# Patient Record
Sex: MALE
[De-identification: names, ages, dates, MRNs, and addresses within clinical notes are randomized per-mention and may not be internally consistent; named-entity substitution may affect disease eponyms.]

---

## 2017-12-11 ENCOUNTER — HOSPITAL ENCOUNTER (OUTPATIENT)
Dept: HOSPITAL 92 - LABBT | Age: 62
Discharge: HOME | End: 2017-12-11
Attending: UROLOGY
Payer: COMMERCIAL

## 2017-12-11 DIAGNOSIS — N13.39: Primary | ICD-10-CM

## 2017-12-11 DIAGNOSIS — N32.89: ICD-10-CM

## 2017-12-11 DIAGNOSIS — N28.9: ICD-10-CM

## 2017-12-11 LAB
ANION GAP SERPL CALC-SCNC: 12 MMOL/L (ref 10–20)
APTT PPP: 38.6 SEC (ref 22.9–36.1)
BUN SERPL-MCNC: 27 MG/DL (ref 8.4–25.7)
CALCIUM SERPL-MCNC: 9.5 MG/DL (ref 7.8–10.44)
CHLORIDE SERPL-SCNC: 107 MMOL/L (ref 98–107)
CO2 SERPL-SCNC: 26 MMOL/L (ref 23–31)
CREAT CL PREDICTED SERPL C-G-VRATE: 0 ML/MIN (ref 70–130)
HCT VFR BLD CALC: 40.3 % (ref 42–52)
HYALINE CASTS #/AREA URNS LPF: (no result) LPF
PROTHROMBIN TIME: 13.7 SEC (ref 12–14.7)
RBC # BLD AUTO: 4.45 MILL/UL (ref 4.7–6.1)
RBC UR QL AUTO: (no result) HPF (ref 0–3)
WBC # BLD AUTO: 6.8 THOU/UL (ref 4.8–10.8)

## 2017-12-11 PROCEDURE — 80048 BASIC METABOLIC PNL TOTAL CA: CPT

## 2017-12-11 PROCEDURE — 85730 THROMBOPLASTIN TIME PARTIAL: CPT

## 2017-12-11 PROCEDURE — 88112 CYTOPATH CELL ENHANCE TECH: CPT

## 2017-12-11 PROCEDURE — 87086 URINE CULTURE/COLONY COUNT: CPT

## 2017-12-11 PROCEDURE — 85610 PROTHROMBIN TIME: CPT

## 2017-12-11 PROCEDURE — 85027 COMPLETE CBC AUTOMATED: CPT

## 2017-12-11 PROCEDURE — 81001 URINALYSIS AUTO W/SCOPE: CPT

## 2017-12-12 ENCOUNTER — HOSPITAL ENCOUNTER (OUTPATIENT)
Dept: HOSPITAL 92 - LABBT | Age: 62
Discharge: HOME | End: 2017-12-12
Attending: UROLOGY
Payer: COMMERCIAL

## 2017-12-12 ENCOUNTER — HOSPITAL ENCOUNTER (OUTPATIENT)
Dept: HOSPITAL 92 - BICCT | Age: 62
Discharge: HOME | End: 2017-12-12
Attending: UROLOGY
Payer: COMMERCIAL

## 2017-12-12 DIAGNOSIS — N13.9: ICD-10-CM

## 2017-12-12 DIAGNOSIS — Z01.812: Primary | ICD-10-CM

## 2017-12-12 DIAGNOSIS — N32.89: ICD-10-CM

## 2017-12-12 DIAGNOSIS — N13.39: Primary | ICD-10-CM

## 2017-12-12 DIAGNOSIS — N13.30: ICD-10-CM

## 2017-12-12 PROCEDURE — 74176 CT ABD & PELVIS W/O CONTRAST: CPT

## 2017-12-12 PROCEDURE — 88112 CYTOPATH CELL ENHANCE TECH: CPT

## 2017-12-12 PROCEDURE — 86900 BLOOD TYPING SEROLOGIC ABO: CPT

## 2017-12-12 PROCEDURE — 86901 BLOOD TYPING SEROLOGIC RH(D): CPT

## 2017-12-12 PROCEDURE — 86850 RBC ANTIBODY SCREEN: CPT

## 2017-12-15 ENCOUNTER — HOSPITAL ENCOUNTER (OUTPATIENT)
Dept: HOSPITAL 92 - SDC | Age: 62
Discharge: HOME | End: 2017-12-15
Attending: UROLOGY
Payer: COMMERCIAL

## 2017-12-15 VITALS — BODY MASS INDEX: 32.5 KG/M2

## 2017-12-15 DIAGNOSIS — I12.9: ICD-10-CM

## 2017-12-15 DIAGNOSIS — N17.9: ICD-10-CM

## 2017-12-15 DIAGNOSIS — Z79.899: ICD-10-CM

## 2017-12-15 DIAGNOSIS — Z79.2: ICD-10-CM

## 2017-12-15 DIAGNOSIS — N13.30: ICD-10-CM

## 2017-12-15 DIAGNOSIS — H53.461: ICD-10-CM

## 2017-12-15 DIAGNOSIS — E66.9: ICD-10-CM

## 2017-12-15 DIAGNOSIS — N40.0: ICD-10-CM

## 2017-12-15 DIAGNOSIS — Z98.890: ICD-10-CM

## 2017-12-15 DIAGNOSIS — N18.3: ICD-10-CM

## 2017-12-15 DIAGNOSIS — E11.3299: ICD-10-CM

## 2017-12-15 DIAGNOSIS — E11.22: ICD-10-CM

## 2017-12-15 DIAGNOSIS — Z95.818: ICD-10-CM

## 2017-12-15 DIAGNOSIS — I69.398: ICD-10-CM

## 2017-12-15 DIAGNOSIS — Z79.82: ICD-10-CM

## 2017-12-15 DIAGNOSIS — C66.2: Primary | ICD-10-CM

## 2017-12-15 DIAGNOSIS — E78.5: ICD-10-CM

## 2017-12-15 DIAGNOSIS — Z87.891: ICD-10-CM

## 2017-12-15 PROCEDURE — 88305 TISSUE EXAM BY PATHOLOGIST: CPT

## 2017-12-15 PROCEDURE — 86850 RBC ANTIBODY SCREEN: CPT

## 2017-12-15 PROCEDURE — C1758 CATHETER, URETERAL: HCPCS

## 2017-12-15 PROCEDURE — C1769 GUIDE WIRE: HCPCS

## 2017-12-15 PROCEDURE — 86901 BLOOD TYPING SEROLOGIC RH(D): CPT

## 2017-12-15 PROCEDURE — 0T778DZ DILATION OF LEFT URETER WITH INTRALUMINAL DEVICE, VIA NATURAL OR ARTIFICIAL OPENING ENDOSCOPIC: ICD-10-PCS | Performed by: UROLOGY

## 2017-12-15 PROCEDURE — 86900 BLOOD TYPING SEROLOGIC ABO: CPT

## 2017-12-15 PROCEDURE — 0TB78ZX EXCISION OF LEFT URETER, VIA NATURAL OR ARTIFICIAL OPENING ENDOSCOPIC, DIAGNOSTIC: ICD-10-PCS | Performed by: UROLOGY

## 2017-12-15 PROCEDURE — 74420 UROGRAPHY RTRGR +-KUB: CPT

## 2017-12-15 NOTE — OP
DATE OF SERVICE:  12/15/2017

 

PREOPERATIVE DIAGNOSES:  A 62-year-old male with left hydronephrosis, acute 
renal insufficiency, rule out malignancy.

 

POSTOPERATIVE DIAGNOSES:  A 62-year-old male with left hydronephrosis, acute 
renal insufficiency, rule out malignancy.

 

PROCEDURE:  Cystoscopy, left retrograde pyelogram, 6 x 28 double-J ureteral 
stent placement with distal tail in situ, rigid ureteroscopy, ureteral biopsy 
x3.

 

SURGEON:  Eleonora Mckay D.O.

 

ANESTHESIA:  General.

 

COMPLICATIONS:  None apparent.

 

DISPOSITION:  To the recovery room in stable condition.

 

IV FLUIDS:  1100 mL.

 

ESTIMATED BLOOD LOSS:  Minimal.

 

DRAINS:  A 6 x 28 left double-J ureteral stent, 18 Moz[place]ican 10 mL urethral Zeng 
catheter to gravity leg bag.

 

INTRAOPERATIVE FINDINGS:

1. MIld/ Moderate benign prostatic hyperplasia.

2.  No evidence of bladder mass.

3.  Left intramural ureteral debris with no evidence of distal ureteral mass.

4.  Left proximal ureteral mass suspicious for TCC.

5.  Severe left hydronephrosis.

 

SPECIMEN:

1.  Left ureteral biopsy x3.

2.  Left distal UVJ intramural ureteral debris sent as a separate specimen.

 

INDICATIONS FOR THE PROCEDURE AND HISTORY:  Mr. Melendez is a 62-year-old male 
referred to me by Nephrology for renal insufficiency and severe hydronephrosis.
  Renal bladder ultrasound demonstrated possible bladder mass; however, this 
was nonspecific.  A CT staging with noncontrast was obtained, there was a 
possibility of bladder mass obstructing the  ureter; however, per my review, 
there is a hyperdensity at the proximal ureter measuring 3.7 cm in craniocaudal 
dimension and distal to this, there is no evidence of hydroureter.  He presents 
today for diagnostic evaluation.  He has been cleared by Internal Medicine, 
Cardiology;  Neurology cleared 

him to be on baby aspirin from 325mg .  Risks and complications including, but 
not limited to, bleeding, pain, infection, injury to adjacent organs, urosepsis
, PE, DVT, perioperative morbidity and mortality CVA reviewed.  All questions 
answered to his satisfaction, he desired to proceed.

 

DESCRIPTION OF THE PROCEDURE:  After an informed consent was signed, the 
patient was taken to the operating room, placed in a dorsal lithotomy position 
with the genital area prepped and draped in the usual surgical sterile fashion.
  Broad-spectrum antibiotics were provided.  A 21 Mozambican cystoscope was 
utilized for cystoscopy.  There was evidence of mild/moderate obstructing 
lateral lobes.  Bladder was entered without any issues.  There was no evidence 
of bladder lesion.  The UOs were identified in normal anatomical location.  
However, the left intramural ureter  appeared have intrinsic prominence  due to 
mass effect.  I was unable to perform a proper retrograde pyelogram as there 
was immediate efflux retrograde due to high-grade obstruction of the intramural 
portion, and unable to pass a 0.35 sensor nor  angled Glidewire.  I 
subsequently transitioned to  rigid ureteroscope, with the ureter engaged with 
the ureteroscope, I was able to pass a 0.35 angled Glidewire to the level of 
the proximal ureter.  Upon passing the ureteroscope/and wire  to assess the 
distal intramural ureter, there was moderate amount of sedimentous whitish 
debris that effluxed . This was evacuated, and sent as a specimen. 
Ureteroscopic assessment of the distal ureter did not demonstrate evidence of 
intrinsic ureteral mass. We then subsequently transitioned to a cystoscope, as 
there was resistance at the level of the L3-L4 ureter with the wire.  We passed 
an open-ended catheter to this level, there was a high-grade obstruction, which 
I was unable to opacify proximal to this on retrograde pyelogram.  With 
negotiation I was able to pass a 0.35 Glidewire to the left upper pole and 
subsequently passed a 5 Moz[place]ican open-ended catheter to the level of the renal 
pelvis.  Retrograde pyelogram confirmed proper placement of the wire 
demonstrating severe hydronephrosis.  The wire was then switched to a 0.35 
sensor wire.  Diagnostic ureteroscopy demonstrated  a moderate large calcified 
mass at the level of L3-L4 ureter consistent with the CAT scan location.  Using 
a back loading rigid cold cup biopsy, obtained three separate biopsies appeared 
to be good specimen.  It is highly suspicious for TCC.  We subsequently passed 
a 6 x 28 double-J ureteral stent with distal tail in situ.  I did scope his 
ureter again to ensure that there was no other lesion, which I did not 
appreciate distal to the proximal ureteral mass.  After the stent was placed, 
bladder was completely evacuated.  An 18 [place] 10 mL urethral Zeng catheter 
was placed as there was moderate amount of debris with decompression of the 
severely hydronephrotic kidney.  He was transferred to the recovery room in 
stable condition.  He will follow up with me next week for removal of Zeng 
catheter.  Discharge with ciprofloxacin 250 mg one p.o. b.i.d. for 7 days, 
Colace p.r.n., AZO p.r.n., VESIcare 5 mg 1 p.o. b.i.d. #5, Norco 5/325 #50 one 
to two p.o. q.6-8 hours p.r.n. prescription is provided.  He will follow up 
regarding pathology results for further staging.

 

MTDD

## 2017-12-15 NOTE — RAD
RETROGRADE PYELOGRAM 11 VIEWS:

12/15/17

 

Attention Brissa in billing: yes, this is an 11 view study.

 

HISTORY: 

62-year-old male with bladder mass and hydronephrosis. 

 

COMPARISON:  

No prior radiographs, CTs or ultrasounds, of the abdomen or pelvis. 

 

FINDINGS:  

Fluoroscopic spot images obtained in the OR of the left side of the abdomen and pelvis demonstrate fi
rst a wire access of what is presumed to be the mid left ureter, followed by access of what is though
t to be the left renal collecting system, followed by ingestion of contrast opacifying two severely d
ilated left renal calyces. The rest of the left renal collecting system is not opacified. Final image
s demonstrate placement of a left nephroureteral stent.

 

IMPRESSION:  

1.      Severe left hydronephrosis. 

2.      Placement of left ureteral stent. 

 

POS: [facility]

## 2017-12-28 ENCOUNTER — HOSPITAL ENCOUNTER (OUTPATIENT)
Dept: HOSPITAL 92 - NM | Age: 62
Discharge: HOME | End: 2017-12-28
Attending: UROLOGY
Payer: COMMERCIAL

## 2017-12-28 DIAGNOSIS — N13.30: ICD-10-CM

## 2017-12-28 DIAGNOSIS — C66.2: Primary | ICD-10-CM

## 2017-12-28 PROCEDURE — A9503 TC99M MEDRONATE: HCPCS

## 2017-12-28 PROCEDURE — 78306 BONE IMAGING WHOLE BODY: CPT

## 2017-12-28 NOTE — NM
NUCLEAR MEDICINE BONE SCAN:

12/28/17

 

HISTORY: 

Left ureteral cancer. 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

 

COMPARISON:  

CT 12/12/17 outside facility. 

 

FINDINGS:  

Whole body delayed imaging was performed after the intravenous administration of 30 millicuries techn
etium 99m MDP. 

 

There is normal osseous uptake of radiotracer. No focal increased areas of decreased areas of rotatio
nal uptake to suggest metastatic disease. Left sided hydronephrosis. Moderate degenerative changes of
 the knees. Moderate degenerative changes of the left ankle and midfoot. 

 

IMPRESSION:  

1.      No evidence of osseous metastatic disease. 

2.      Left sided hydronephrosis. 

 

POS: [facility]

## 2017-12-29 ENCOUNTER — HOSPITAL ENCOUNTER (OUTPATIENT)
Dept: HOSPITAL 92 - CT | Age: 62
Discharge: HOME | End: 2017-12-29
Attending: INTERNAL MEDICINE
Payer: COMMERCIAL

## 2017-12-29 DIAGNOSIS — J43.9: ICD-10-CM

## 2017-12-29 DIAGNOSIS — C66.2: Primary | ICD-10-CM

## 2017-12-29 DIAGNOSIS — R91.1: ICD-10-CM

## 2017-12-29 DIAGNOSIS — N40.0: ICD-10-CM

## 2017-12-29 DIAGNOSIS — N28.89: ICD-10-CM

## 2017-12-29 LAB
ALP SERPL-CCNC: 62 U/L (ref 40–150)
ALT SERPL W P-5'-P-CCNC: 36 U/L (ref 8–55)
ANION GAP SERPL CALC-SCNC: 12 MMOL/L (ref 10–20)
AST SERPL-CCNC: 24 U/L (ref 5–34)
BILIRUB DIRECT SERPL-MCNC: 0.3 MG/DL (ref 0.1–0.3)
BILIRUB SERPL-MCNC: 0.6 MG/DL (ref 0.2–1.2)
BUN SERPL-MCNC: 29 MG/DL (ref 8.4–25.7)
CALCIUM SERPL-MCNC: 10.2 MG/DL (ref 7.8–10.44)
CHLORIDE SERPL-SCNC: 107 MMOL/L (ref 98–107)
CO2 SERPL-SCNC: 26 MMOL/L (ref 23–31)
CREAT CL PREDICTED SERPL C-G-VRATE: 0 ML/MIN (ref 70–130)

## 2017-12-29 PROCEDURE — 71250 CT THORAX DX C-: CPT

## 2017-12-29 PROCEDURE — 74177 CT ABD & PELVIS W/CONTRAST: CPT

## 2017-12-29 PROCEDURE — 80076 HEPATIC FUNCTION PANEL: CPT

## 2017-12-29 PROCEDURE — 80048 BASIC METABOLIC PNL TOTAL CA: CPT

## 2017-12-29 NOTE — CT
CT CHEST AND ABDOMEN AND PELVIS WITHOUT IV CONTRAST:

 

HISTORY:

Left ureteral cancer.  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

 

FINDINGS:

Absence of oral and IV contrast reduces the sensitivity of the exam, particularly for evaluation of m
ediastinal hilar vascular structures and solid organs involved.  The possibility of metastatic diseas
e cannot be completely excluded on this study.

 

Bullous changes are seen in the lung fields bilaterally.  There is a 5 mm peripheral nodule in the le
ft lower lobe.  No pleural or pericardial effusions are seen.

 

No mediastinal, axillary, or periaortic lymphadenopathy is seen.  There are vascular calcifications w
ithout evidence of aneurysmal dilatation of the thoracic aorta.

 

No calcified gallstones are seen.  No free air or free fluid is noted in the abdomen or pelvis.  A no
rmal appearing appendix is seen.  A fat-containing left inguinal hernia is present.

 

No calculi are seen in the kidneys, ureters, or urinary bladder.  There is a 1 cm low density lesion 
in the right kidney and a 1.4 cm low density lesion in the left renal cortex.  There is a left-sided 
ureteral stent.  There is thickening of the proximal left ureter and left hydronephrosis.  The prosta
te is enlarged.  There are degenerative changes in the thoracolumbar spine.  No osteolytic or osteobl
astic lesions are seen.

 

IMPRESSION:

1.  Bullous emphysema.

 

2.  A 5 mm, indeterminate left lower lobe lung nodule.

 

3.  Low density lesion in the kidneys.  Ultrasound is recommended to confirm that these represent cys
ts.

 

4.  Left proximal ureteric mass/malignancy.

 

5.  Prostatic enlargement.

 

POS: [facility]

## 2018-01-22 ENCOUNTER — HOSPITAL ENCOUNTER (OUTPATIENT)
Dept: HOSPITAL 92 - SDC | Age: 63
Discharge: HOME | End: 2018-01-22
Attending: SURGERY
Payer: COMMERCIAL

## 2018-01-22 VITALS — BODY MASS INDEX: 33.5 KG/M2

## 2018-01-22 DIAGNOSIS — C64.9: Primary | ICD-10-CM

## 2018-01-22 DIAGNOSIS — E66.9: ICD-10-CM

## 2018-01-22 DIAGNOSIS — E11.22: ICD-10-CM

## 2018-01-22 DIAGNOSIS — Z79.899: ICD-10-CM

## 2018-01-22 DIAGNOSIS — Z79.82: ICD-10-CM

## 2018-01-22 DIAGNOSIS — C66.2: ICD-10-CM

## 2018-01-22 DIAGNOSIS — M10.9: ICD-10-CM

## 2018-01-22 DIAGNOSIS — E11.3299: ICD-10-CM

## 2018-01-22 DIAGNOSIS — Z98.890: ICD-10-CM

## 2018-01-22 DIAGNOSIS — I12.9: ICD-10-CM

## 2018-01-22 DIAGNOSIS — E78.5: ICD-10-CM

## 2018-01-22 DIAGNOSIS — Z95.818: ICD-10-CM

## 2018-01-22 DIAGNOSIS — I69.398: ICD-10-CM

## 2018-01-22 DIAGNOSIS — H53.461: ICD-10-CM

## 2018-01-22 DIAGNOSIS — N18.3: ICD-10-CM

## 2018-01-22 PROCEDURE — C1788 PORT, INDWELLING, IMP: HCPCS

## 2018-01-22 PROCEDURE — 0JH63WZ INSERTION OF TOTALLY IMPLANTABLE VASCULAR ACCESS DEVICE INTO CHEST SUBCUTANEOUS TISSUE AND FASCIA, PERCUTANEOUS APPROACH: ICD-10-PCS | Performed by: SURGERY

## 2018-01-22 PROCEDURE — 71045 X-RAY EXAM CHEST 1 VIEW: CPT

## 2018-01-22 PROCEDURE — S0020 INJECTION, BUPIVICAINE HYDRO: HCPCS

## 2018-01-22 NOTE — OP
DATE OF PROCEDURE:  01/22/2018

 

PREOPERATIVE DIAGNOSIS:  Renal cell carcinoma.

 

POSTOPERATIVE DIAGNOSIS:  Renal cell carcinoma.

 

PROCEDURE:  MediPort (tunneled central line subcutaneous port,  CT injectable). 

 

SURGEON:  Riley Nguyen M.D.

 

ANESTHESIA:  General.

 

ESTIMATED BLOOD LOSS:  Minimal.

 

COMPLICATIONS:  None.

 

SPECIMEN:  None.

 

FINDINGS:  Tip of the catheter is at the atriocaval junction.

 

TECHNIQUE:  The patient was taken to the operation table.  After sedation was obtained, bilateral nec
k and chest was shaved, and draped in a sterile fashion.  Local anesthetic infiltrated over the right
 internal jugular vein.  Intrajugular vein cannulated using a 22-gauge finder needle followed by a Se
ldinger needle.  Wire was passed into the superior vena cava under fluoroscopic guidance.  A small ni
ck was made at the wire exit site.  A separate 3-cm incision was made in the right upper chest.  Subc
utaneous pocket made below the lower incision.  The tubing is threaded from the inferior to superior 
incision.  Introducer sheath was placed over the wire into the superior vena cava under fluoroscopic 
guidance.  The dilator and wire were removed.  The end of the catheter was threaded into the sheath a
nd the sheath was peeled away.  The tip of the catheter was at atriocaval junction.  MediPort tubing 
cut to fit the MediPort at the lower incision, connected this to the MediPort which was sewn to the c
hest wall in the subcutaneous pocket using Prolene.  The MediPort flushes and draws blood without dif
ficulty.  It is flushed with heparin flush.  The wound was irrigated and closed using 3-0 Vicryl, 4-0
 Monocryl, and Dermabond.  The patient was en route to recovery in stable condition.  All instrument 
counts, needle counts, and lap counts were correct.

## 2018-01-22 NOTE — RAD
ONE VIEW CHEST:

 

History: Mediport catheter placement. Evaluate for pneumothorax. 

 

Comparison: None. 

 

FINDINGS: 

Right sided Mediport catheter placement with the distal tip projecting over the right atrium. There i
s atherosclerosis of the aorta. Normal cardiac silhouette. No consolidation or masses. No pneumothora
x or osseous abnormalities. 

 

IMPRESSION: 

No pneumothorax. 

 

POS: [facility]

## 2018-03-26 ENCOUNTER — HOSPITAL ENCOUNTER (OUTPATIENT)
Dept: HOSPITAL 92 - NM | Age: 63
Discharge: HOME | End: 2018-03-26
Attending: UROLOGY
Payer: COMMERCIAL

## 2018-03-26 DIAGNOSIS — R91.8: ICD-10-CM

## 2018-03-26 DIAGNOSIS — Z96.0: ICD-10-CM

## 2018-03-26 DIAGNOSIS — N28.89: ICD-10-CM

## 2018-03-26 DIAGNOSIS — C66.2: Primary | ICD-10-CM

## 2018-03-26 PROCEDURE — 71046 X-RAY EXAM CHEST 2 VIEWS: CPT

## 2018-03-26 PROCEDURE — 74176 CT ABD & PELVIS W/O CONTRAST: CPT

## 2018-03-26 NOTE — RAD
FRONTAL AND LATERAL IMAGING OF THE CHEST:

03/26/2018

 

COMPARISON:

01/22/2018

 

HISTORY:

Transitional cell carcinoma.

 

FINDINGS:

Incompletely imaged ureteral stent present on the left.  Stable left loop recorder and right Port-A-C
ath.  No pneumothorax, lobar consolidation, or alveolar edema.  Inspiration is shallow.  There is pat
brie increased linear density in the left lung base which may signify volume loss or less likely, infi
ltrate.

 

IMPRESSION:   

No lobar consolidation or alveolar edema.  Patchy opacity noted in the left lung base.

 

 

POS: [facility]

## 2018-03-26 NOTE — CT
CT ABDOMEN AND PELVIS WITHOUT IV CONTRAST:

 

INDICATIONS:

History of primary transitional cell carcinoma, currently on chemotherapy.

 

COMPARISON:

12/29/2017.

 

FINDINGS:

There are small pulmonary cysts involving the lower lungs bilaterally, likely related to areas of sma
ll centrilobular emphysema.

 

Unopacified liver, spleen, pancreas, and adrenal glands are unremarkable.  The moderate left pelvocal
iectasis is similar appearing.  The wall thickening surrounding the proximal left ureter and the left
 UPJ appears similar.  The left double-J ureteral stent is unchanged in position.

 

The small renal hypodensities bilaterally are similar appearing and difficult to fully characterize d
ue to their size.

 

There is a normal appendix in the right lower quadrant.

 

There are moderate vascular calcifications seen bilaterally.

 

There are fat-containing inguinal hernias bilaterally.

 

No definite acute osseous abnormality is evident.

 

IMPRESSION:

1.  Stable examination of abdomen and pelvis without intravenous contrast.

 

2.  There is prominent wall thickening involving the proximal left ureter, consistent with the patien
t's history of transitional cell carcinoma.

 

3.  Moderate left pelvocaliectasis remains.

 

4.  Left ureteral stent is unchanged in position.

 

5.  Renal hypodensities are difficulty to characterize on this noncontrast CT evaluation.  Renal ultr
asound may be helpful for additional characterization.

 

6.  Chronic findings as above.

 

POS: [facility]

## 2018-04-09 ENCOUNTER — HOSPITAL ENCOUNTER (OUTPATIENT)
Dept: HOSPITAL 92 - LABBT | Age: 63
Discharge: HOME | End: 2018-04-09
Attending: UROLOGY
Payer: COMMERCIAL

## 2018-04-09 DIAGNOSIS — Z01.818: Primary | ICD-10-CM

## 2018-04-09 DIAGNOSIS — C66.2: ICD-10-CM

## 2018-04-09 LAB
ALBUMIN SERPL BCG-MCNC: 4 G/DL (ref 3.4–4.8)
ALP SERPL-CCNC: 49 U/L (ref 40–150)
ALT SERPL W P-5'-P-CCNC: 19 U/L (ref 8–55)
ANION GAP SERPL CALC-SCNC: 9 MMOL/L (ref 10–20)
APTT PPP: 35.3 SEC (ref 22.9–36.1)
AST SERPL-CCNC: 19 U/L (ref 5–34)
BILIRUB SERPL-MCNC: 0.3 MG/DL (ref 0.2–1.2)
BUN SERPL-MCNC: 27 MG/DL (ref 8.4–25.7)
CALCIUM SERPL-MCNC: 9.4 MG/DL (ref 7.8–10.44)
CHLORIDE SERPL-SCNC: 108 MMOL/L (ref 98–107)
CO2 SERPL-SCNC: 25 MMOL/L (ref 23–31)
CREAT CL PREDICTED SERPL C-G-VRATE: 0 ML/MIN (ref 70–130)
GLOBULIN SER CALC-MCNC: 2.9 G/DL (ref 2.4–3.5)
GLUCOSE SERPL-MCNC: 94 MG/DL (ref 80–115)
HGB BLD-MCNC: 10.9 G/DL (ref 14–18)
INR PPP: 1
MCH RBC QN AUTO: 31.5 PG (ref 27–31)
MCV RBC AUTO: 92.6 FL (ref 80–94)
PLATELET # BLD AUTO: 161 THOU/UL (ref 130–400)
POTASSIUM SERPL-SCNC: 4.4 MMOL/L (ref 3.5–5.1)
PROTHROMBIN TIME: 13.1 SEC (ref 12–14.7)
RBC # BLD AUTO: 3.47 MILL/UL (ref 4.7–6.1)
SODIUM SERPL-SCNC: 138 MMOL/L (ref 136–145)
WBC # BLD AUTO: 3.9 THOU/UL (ref 4.8–10.8)

## 2018-04-09 PROCEDURE — 93005 ELECTROCARDIOGRAM TRACING: CPT

## 2018-04-09 PROCEDURE — 85610 PROTHROMBIN TIME: CPT

## 2018-04-09 PROCEDURE — 80053 COMPREHEN METABOLIC PANEL: CPT

## 2018-04-09 PROCEDURE — 93010 ELECTROCARDIOGRAM REPORT: CPT

## 2018-04-09 PROCEDURE — 85027 COMPLETE CBC AUTOMATED: CPT

## 2018-04-09 PROCEDURE — 85730 THROMBOPLASTIN TIME PARTIAL: CPT

## 2018-04-09 NOTE — EKG
Test Reason : 

Blood Pressure : ***/*** mmHG

Vent. Rate : 075 BPM     Atrial Rate : 075 BPM

   P-R Int : 150 ms          QRS Dur : 086 ms

    QT Int : 362 ms       P-R-T Axes : 028 017 007 degrees

   QTc Int : 404 ms

 

Normal sinus rhythm

Normal ECG

When compared with ECG of 10-APR-2017 15:04,

ST elevation now present in Lateral leads

Nonspecific T wave abnormality no longer evident in Lateral leads

Confirmed by MELISSA CARPENTER ([id number]) on 4/9/2018 9:10:06 PM

 

Referred By:  LIBERTAD           Confirmed By:MELISSA CARPENTER

## 2018-04-13 ENCOUNTER — HOSPITAL ENCOUNTER (OUTPATIENT)
Dept: HOSPITAL 92 - LABBT | Age: 63
Discharge: HOME | End: 2018-04-13
Attending: UROLOGY
Payer: COMMERCIAL

## 2018-04-13 DIAGNOSIS — Z01.812: Primary | ICD-10-CM

## 2018-04-13 DIAGNOSIS — C66.2: ICD-10-CM

## 2018-04-13 PROCEDURE — 86850 RBC ANTIBODY SCREEN: CPT

## 2018-04-13 PROCEDURE — 86901 BLOOD TYPING SEROLOGIC RH(D): CPT

## 2018-04-13 PROCEDURE — 86900 BLOOD TYPING SEROLOGIC ABO: CPT

## 2018-04-16 ENCOUNTER — HOSPITAL ENCOUNTER (INPATIENT)
Dept: HOSPITAL 92 - SURG A | Age: 63
LOS: 4 days | Discharge: HOME | DRG: 657 | End: 2018-04-20
Attending: UROLOGY | Admitting: UROLOGY
Payer: COMMERCIAL

## 2018-04-16 VITALS — BODY MASS INDEX: 31.4 KG/M2

## 2018-04-16 DIAGNOSIS — Z79.82: ICD-10-CM

## 2018-04-16 DIAGNOSIS — C66.2: Primary | ICD-10-CM

## 2018-04-16 DIAGNOSIS — I12.9: ICD-10-CM

## 2018-04-16 DIAGNOSIS — K56.7: ICD-10-CM

## 2018-04-16 DIAGNOSIS — Z79.899: ICD-10-CM

## 2018-04-16 DIAGNOSIS — E11.3299: ICD-10-CM

## 2018-04-16 DIAGNOSIS — E78.5: ICD-10-CM

## 2018-04-16 DIAGNOSIS — N18.3: ICD-10-CM

## 2018-04-16 LAB
ANION GAP SERPL CALC-SCNC: 10 MMOL/L (ref 10–20)
BASOPHILS # BLD AUTO: 0 THOU/UL (ref 0–0.2)
BASOPHILS NFR BLD AUTO: 0.3 % (ref 0–1)
BUN SERPL-MCNC: 27 MG/DL (ref 8.4–25.7)
CALCIUM SERPL-MCNC: 8.7 MG/DL (ref 7.8–10.44)
CHLORIDE SERPL-SCNC: 106 MMOL/L (ref 98–107)
CO2 SERPL-SCNC: 25 MMOL/L (ref 23–31)
CREAT CL PREDICTED SERPL C-G-VRATE: 67 ML/MIN (ref 70–130)
EOSINOPHIL # BLD AUTO: 0 THOU/UL (ref 0–0.7)
EOSINOPHIL NFR BLD AUTO: 0.3 % (ref 0–10)
GLUCOSE SERPL-MCNC: 136 MG/DL (ref 80–115)
HGB BLD-MCNC: 10 G/DL (ref 14–18)
LYMPHOCYTES # BLD: 0.5 THOU/UL (ref 1.2–3.4)
LYMPHOCYTES NFR BLD AUTO: 6.4 % (ref 21–51)
MCH RBC QN AUTO: 31.4 PG (ref 27–31)
MCV RBC AUTO: 94.6 FL (ref 80–94)
MONOCYTES # BLD AUTO: 0.5 THOU/UL (ref 0.11–0.59)
MONOCYTES NFR BLD AUTO: 7.1 % (ref 0–10)
NEUTROPHILS # BLD AUTO: 6.4 THOU/UL (ref 1.4–6.5)
NEUTROPHILS NFR BLD AUTO: 85.9 % (ref 42–75)
PLATELET # BLD AUTO: 124 THOU/UL (ref 130–400)
POTASSIUM SERPL-SCNC: 4 MMOL/L (ref 3.5–5.1)
RBC # BLD AUTO: 3.2 MILL/UL (ref 4.7–6.1)
SODIUM SERPL-SCNC: 137 MMOL/L (ref 136–145)
WBC # BLD AUTO: 7.5 THOU/UL (ref 4.8–10.8)

## 2018-04-16 PROCEDURE — 80048 BASIC METABOLIC PNL TOTAL CA: CPT

## 2018-04-16 PROCEDURE — 0TT10ZZ RESECTION OF LEFT KIDNEY, OPEN APPROACH: ICD-10-PCS | Performed by: UROLOGY

## 2018-04-16 PROCEDURE — 88300 SURGICAL PATH GROSS: CPT

## 2018-04-16 PROCEDURE — 82570 ASSAY OF URINE CREATININE: CPT

## 2018-04-16 PROCEDURE — 85025 COMPLETE CBC W/AUTO DIFF WBC: CPT

## 2018-04-16 PROCEDURE — 71045 X-RAY EXAM CHEST 1 VIEW: CPT

## 2018-04-16 PROCEDURE — 88341 IMHCHEM/IMCYTCHM EA ADD ANTB: CPT

## 2018-04-16 PROCEDURE — 0PB10ZZ EXCISION OF 1 TO 2 RIBS, OPEN APPROACH: ICD-10-PCS | Performed by: UROLOGY

## 2018-04-16 PROCEDURE — 88342 IMHCHEM/IMCYTCHM 1ST ANTB: CPT

## 2018-04-16 PROCEDURE — 0TT70ZZ RESECTION OF LEFT URETER, OPEN APPROACH: ICD-10-PCS | Performed by: UROLOGY

## 2018-04-16 PROCEDURE — 36415 COLL VENOUS BLD VENIPUNCTURE: CPT

## 2018-04-16 PROCEDURE — 36416 COLLJ CAPILLARY BLOOD SPEC: CPT

## 2018-04-16 PROCEDURE — A4216 STERILE WATER/SALINE, 10 ML: HCPCS

## 2018-04-16 PROCEDURE — 82805 BLOOD GASES W/O2 SATURATION: CPT

## 2018-04-16 PROCEDURE — 88307 TISSUE EXAM BY PATHOLOGIST: CPT

## 2018-04-16 RX ADMIN — CEFTRIAXONE SCH MLS: 1 INJECTION, POWDER, FOR SOLUTION INTRAMUSCULAR; INTRAVENOUS at 17:56

## 2018-04-16 RX ADMIN — Medication SCH: at 23:11

## 2018-04-16 RX ADMIN — FAMOTIDINE SCH: 10 INJECTION, SOLUTION INTRAVENOUS at 23:11

## 2018-04-16 NOTE — RAD
SINGLE VIEW OF THE CHEST:

 

COMPARISON: 

1/22/18.

 

HISTORY: 

Pneumothorax.

 

FINDINGS: 

A single view of the chest shows a cardiomediastinal silhouette which is upper limits of normal in si
ze.  The MediPort is unchanged in position.  There is a cardiac monitoring device projecting over the
 left chest wall.  There is no evidence of consolidation, mass, or pleural effusion.  An NG tube is s
een with its tip in the stomach.

 

IMPRESSION: 

No evidence of acute cardiopulmonary disease.

 

POS: [facility]

## 2018-04-17 LAB
ANION GAP SERPL CALC-SCNC: 11 MMOL/L (ref 10–20)
BASOPHILS # BLD AUTO: 0 THOU/UL (ref 0–0.2)
BASOPHILS NFR BLD AUTO: 0 % (ref 0–1)
BUN SERPL-MCNC: 22 MG/DL (ref 8.4–25.7)
CALCIUM SERPL-MCNC: 8.4 MG/DL (ref 7.8–10.44)
CHLORIDE SERPL-SCNC: 104 MMOL/L (ref 98–107)
CO2 SERPL-SCNC: 24 MMOL/L (ref 23–31)
CREAT CL PREDICTED SERPL C-G-VRATE: 66 ML/MIN (ref 70–130)
EOSINOPHIL # BLD AUTO: 0 THOU/UL (ref 0–0.7)
EOSINOPHIL NFR BLD AUTO: 0.4 % (ref 0–10)
GLUCOSE SERPL-MCNC: 92 MG/DL (ref 80–115)
HGB BLD-MCNC: 9.7 G/DL (ref 14–18)
LYMPHOCYTES # BLD: 0.9 THOU/UL (ref 1.2–3.4)
LYMPHOCYTES NFR BLD AUTO: 13.1 % (ref 21–51)
MCH RBC QN AUTO: 31.4 PG (ref 27–31)
MCV RBC AUTO: 92.6 FL (ref 80–94)
MONOCYTES # BLD AUTO: 0.9 THOU/UL (ref 0.11–0.59)
MONOCYTES NFR BLD AUTO: 12.8 % (ref 0–10)
NEUTROPHILS # BLD AUTO: 4.9 THOU/UL (ref 1.4–6.5)
NEUTROPHILS NFR BLD AUTO: 73.7 % (ref 42–75)
PLATELET # BLD AUTO: 111 THOU/UL (ref 130–400)
POTASSIUM SERPL-SCNC: 3.9 MMOL/L (ref 3.5–5.1)
RBC # BLD AUTO: 3.09 MILL/UL (ref 4.7–6.1)
SODIUM SERPL-SCNC: 135 MMOL/L (ref 136–145)
WBC # BLD AUTO: 6.6 THOU/UL (ref 4.8–10.8)

## 2018-04-17 RX ADMIN — CEFTRIAXONE SCH MLS: 1 INJECTION, POWDER, FOR SOLUTION INTRAMUSCULAR; INTRAVENOUS at 17:20

## 2018-04-17 RX ADMIN — Medication SCH: at 20:42

## 2018-04-17 RX ADMIN — FAMOTIDINE SCH: 10 INJECTION, SOLUTION INTRAVENOUS at 14:59

## 2018-04-17 RX ADMIN — Medication SCH ML: at 09:30

## 2018-04-17 RX ADMIN — FAMOTIDINE SCH MG: 10 INJECTION, SOLUTION INTRAVENOUS at 20:42

## 2018-04-17 NOTE — PRG
DATE OF SERVICE:  04/17/2018

 

SUBJECTIVE:  The patient feeling well, epidural is providing good pain control.
  Denies headache, nausea.  However, his appetite is not great this morning.  
No flatus as of yet.

 

PHYSICAL EXAMINATION: 

VITAL SIGNS:  Afebrile, 98.4, 98, 16, 94%, 171/80.  I's and O's 750 in, 2485 
out.  DARLENE 35 since OR.  Urine output 2450.

LUNGS:  Clear to auscultation.

ABDOMEN:  Soft, nontender, bowel sounds are present, mildly sluggish.  Incision 
dressing changed.  Minimal drainage at the inferior aspect.  DARLENE is adequately 
secured charged.

EXTREMITIES:  No cyanosis, clubbing or edema.  No calf tenderness.

 

LABORATORY DATA:  White count 6, hemoglobin 9.7, platelet 111, sodium 135, 
potassium 3.9, BUN 22, creatinine 1.4 with baseline creatinine is 1.82-1.39.

 

IMPRESSION AND PLAN:  Postop day #1 status post left nephroureterectomy with 
bladder cuff, transurethral incision of left ureteral orifice, left stent 
removal.   patient  doing well.  Continue epidural, titrate slowly down per 
pain service, no Toradol as he is status post nephrectomy with history of renal 
insufficiency.  As his appetite is somewhat poor, we will keep the patient on 
clear liquids.  PT consultation has been obtained for a walking program as the 
patient is to be out of bed with assist only.  Continue IV fluids.  DVT medical 
prophylaxis when urine output has been clear off CBI for minimum 24 hours.  We 
will hold CBI today and monitor his urine output.  Aggressive mechanical DVT 
prophylaxis with bilateral LARRY hose, SCDs and dorsi and plantar flexion while 
in bed.

 

TRUPTI

## 2018-04-17 NOTE — OP
DATE OF PROCEDURE:  04/16/2018

 

PREOPERATIVE DIAGNOSIS:  A 62-year-old male with history of left high-grade 
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 
chemotherapy.

 

POSTOPERATIVE DIAGNOSIS:  A 62-year-old male with history of left high-grade 
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 
chemotherapy.

 

PROCEDURES:  Cystoscopy, transurethral incision of left ureteral orifice, 
removal of left ureteral stent, left nephroureterectomy with bladder cuff, 
partial resection of left twelfth rib.

 

SURGEON:  Eleonora Mckay D.O.

 

ASSISTANT:  Dwaien Rachel M.D.

 

ANESTHESIA:  General spinal.

 

COMPLICATIONS:  None apparent.

 

DISPOSITION:  To recovery room in stable condition.

 

DRAINS:

1.  A 22-St Lucian 30 mL three-way Zeng catheter to CBI.

2.  A #10 DARLENE to bulb suction.

 

SPECIMENS:

1.  Left kidney, ureter, and bladder cuff.

2. tip of the 12th rib.

 

INDICATIONS FOR THE PROCEDURE AND HISTORY:  Mr. Melendez is a 62-year-old 
 male with history of hypertension who presented to my office due to 
incidental finding of hydronephrosis.  Subsequent workup demonstrated a high-
grade urothelial carcinoma of the proximal ureter with lamina propria invasion 
with squamous differentiation.  He underwent ureteral biopsy, underwent 
ureteral stent due to severe hydronephrosis.  He has received 4 cycles of 
neoadjuvant chemotherapy with cisplatin and Gemzar.  He presents today for left 
nephroureterectomy with bladder cuff.  Risks and complications of the procedure 
was reviewed with him in detail including, but not limited to, bleeding, pain, 
infection, injury to adjacent organs, urosepsis, PE, DVT, perioperative 
morbidity and mortality, renal insufficiency/failure requiring hemodialysis, 
wound complication was reviewed with the patient and family in detail and he 
desired to proceed without reservation.

 

DESCRIPTION OF THE PROCEDURE:  After an informed consent is signed, the patient 
is taken to the operating room, placed in supine position with general 
anesthesia administered.  It was difficulty obtaining a spinal.  Therefore, 
this will be attempted post procedure.  The patient received broad spectrum 
antibiotics with Levaquin and Ancef and placed in dorsal lithotomy position.  
Bilateral LARRY hose and SCDs were provided.  Using a 22-St Lucian cystoscope 
utilized for cystoscopy which demonstrated normal anterior and posterior urethra
, prostatic urethra demonstrated mild to moderately obstructing prostate.  Upon 
entering the bladder, again noted is a previously appreciated left ureteral 
stent.  There was no evidence of bladder tumor as appreciated on the recent 
local cystoscopy.  We then transitioned to a 26-St Lucian resectoscope with a 
Gates knife.  We scored the left ureteral orifice as much as we could 
visualizing the perivesical fat.  Then we subsequently passed a 22-St Lucian 30 mL 
urethral Zeng catheter with CBI tubing attached.  The patient was then 
reprepped and draped for the left open nephroureterectomy.  The patient was 
positioned of modified flank position with all pressure points padded and 
protected at all times.  NG tube was placed.  At this time, using a supra 
eleventh intercostal retroperitoneal incision, the incision was made from the 
paraspinous muscle towards the midline towards the umbilicus.  The skin was 
opened to the limits of skin incision.  The latissimus and the external and 
internal oblique muscles were opened to the limits of skin incision.  The 
transversalis muscle was then split entering the retroperitoneal space.  The 
tip of the 11th and the 12th rib was visualized.  We made an incision just 
inferior to the eleventh rib.  The retroperitoneal space was developed using 
blunt and sharp dissection.  We mobilized the peritoneum off the anterior 
rectus sheath.  The ureter was isolated, which appeared to be densely adherent 
and had phlegmonous reaction due to the previous ureteral tumor.  This was 
dissected to the level distally as much as we can from the incision.  The 
kidney was completely mobilized superior medially.  The peritoneum appeared to 
be adherent to the anterior serratus fascia as well as superior.  We made a 
small peritoneotomy incision which was closed with 2-0 Vicryl.  The superior 
pole of the kidney was completely mobilized.  We left the adrenal gland in situ 
as there is no evidence of abnormality on the imaging nor any significant lymph 
nodes.  The kidney was completely mobilized except the renal hilum.  There was 
no significant lymphadenopathy noted on CT nor on gross inspection.  The renal 
artery and vein was developed sharply, and using a vascular stapler 2 mm load, 
45 mm in length, [place] flex endoscopic vascular stapler was utilized to 
staple the hilum and divided.  Good hemostasis was obtained.  There was minimal 
oozing from the renal fossa.  The kidney was wrapped in appropriate dressing 
and the ureter was isolated distally.  We extended the incision towards the 
pubic symphysis paramedian mobilizing the ureter distally as it entered the 
bladder hiatus.  Using sharp dissection, we mobilized the ureter to the level 
of the bladder cuff that was scored.  The stent and the ureter were removed en 
bloc with the kidney.  The bladder was then closed with 2-0 Vicryl in a figure-
of-eight fashion.  We tested this demonstrating good closure with no 
significant leak noted on bladder irrigation.  At this time, the wound was 
copiously irrigated.  There were no significant pathologic lymph nodes to be 
resected.  We did not see any lymph nodes that were present to be removed in 
the hilum.  At this time, the wound was copiously irrigated.  FloSeal was 
placed in the bed of the renal hilum and the adrenal gland region and SurgiSeal 
laid in the spot.  The wound was closed with 0 PDS in two layers.  Prior to 
closure of the wound, we placed a #10 DARLENE in the left retroperitoneal space and 
sutured to skin using 4-0 nylon.  Skin was closed with skin staples and he 
tolerated the procedure well.  Dr. Addison from anesthesia will attempt to 
place an epidural post procedure.

 

TRUPTI

## 2018-04-18 LAB
ANION GAP SERPL CALC-SCNC: 8 MMOL/L (ref 10–20)
BASOPHILS # BLD AUTO: 0 THOU/UL (ref 0–0.2)
BASOPHILS NFR BLD AUTO: 0.4 % (ref 0–1)
BUN SERPL-MCNC: 16 MG/DL (ref 8.4–25.7)
CALCIUM SERPL-MCNC: 8.4 MG/DL (ref 7.8–10.44)
CHLORIDE SERPL-SCNC: 106 MMOL/L (ref 98–107)
CO2 SERPL-SCNC: 26 MMOL/L (ref 23–31)
CREAT CL PREDICTED SERPL C-G-VRATE: 66 ML/MIN (ref 70–130)
EOSINOPHIL # BLD AUTO: 0.1 THOU/UL (ref 0–0.7)
EOSINOPHIL NFR BLD AUTO: 1.7 % (ref 0–10)
GLUCOSE SERPL-MCNC: 92 MG/DL (ref 80–115)
HGB BLD-MCNC: 9.8 G/DL (ref 14–18)
LYMPHOCYTES # BLD: 0.9 THOU/UL (ref 1.2–3.4)
LYMPHOCYTES NFR BLD AUTO: 10.2 % (ref 21–51)
MCH RBC QN AUTO: 32 PG (ref 27–31)
MCV RBC AUTO: 93.7 FL (ref 80–94)
MONOCYTES # BLD AUTO: 1.1 THOU/UL (ref 0.11–0.59)
MONOCYTES NFR BLD AUTO: 12.5 % (ref 0–10)
NEUTROPHILS # BLD AUTO: 6.5 THOU/UL (ref 1.4–6.5)
NEUTROPHILS NFR BLD AUTO: 75.3 % (ref 42–75)
PLATELET # BLD AUTO: 91 THOU/UL (ref 130–400)
POTASSIUM SERPL-SCNC: 3.8 MMOL/L (ref 3.5–5.1)
RBC # BLD AUTO: 3.05 MILL/UL (ref 4.7–6.1)
SODIUM SERPL-SCNC: 136 MMOL/L (ref 136–145)
WBC # BLD AUTO: 8.6 THOU/UL (ref 4.8–10.8)

## 2018-04-18 RX ADMIN — CEFTRIAXONE SCH MLS: 1 INJECTION, POWDER, FOR SOLUTION INTRAMUSCULAR; INTRAVENOUS at 16:13

## 2018-04-18 RX ADMIN — Medication SCH: at 08:34

## 2018-04-18 RX ADMIN — FAMOTIDINE SCH MG: 10 INJECTION, SOLUTION INTRAVENOUS at 20:19

## 2018-04-18 RX ADMIN — FAMOTIDINE SCH: 10 INJECTION, SOLUTION INTRAVENOUS at 08:40

## 2018-04-18 RX ADMIN — Medication SCH ML: at 20:20

## 2018-04-18 NOTE — PRG
DATE OF SERVICE:  04/18/2018

 

SUBJECTIVE:  The patient denies nausea or vomiting, has not yet passed flatus.  
Pain is adequately controlled.  He has been out of bed.

 

OBJECTIVE:

VITAL SIGNS:  Stable, 98.1, 90, 17, 91, 161/76.  I's and O's 1140 in and 5855 
out, of which urine output is 2850 over 24 hours, DARLENE 70/24, 30 serosanguineous 
over the last 24 hours.

LUNGS:  Clear to auscultation.

ABDOMEN:  Soft, nontender, nondistended.  Dressing is changed.  There is some 
serosanguineous drainage from the inferior aspect, dependent in position.  No 
ecchymosis is appreciated.  DARLENE is adequately charged.  Bowel sounds are present.

EXTREMITIES:  No cyanosis, clubbing, or edema.  No calf tenderness.

 

PERTINENT LABORATORY DATA:  White count 8.6, hemoglobin stable at 9.1; however, 
platelet count is decreased to 91, BUN 16, creatinine 1.4, stable.  Pathology 
pending.

 

IMPRESSION AND PLAN:  Mr. Melendez is a 62-year-old male with history of high-
grade left ureteral transitional cell carcinoma, status post nephroureterectomy 
with bladder cuff, stent removal.  His CBI has been off for 24 hours with clear 
urine output; therefore, CBI port is plugged.  Patient does have bowel sounds; 
however, not yet passed flatus.  We will slowly advance him to full liquid diet 
and monitor.  He is encouraged to be aggressively out of bed today with assist.
  As his platelet count is trending down,  will hold initiating his Lovenox for 
deep vein thrombosis prophylaxis ; continuing mechanical prophylaxis with 
aggressive out of bed.  If his platelet count is stable, we will initiate 
Lovenox tomorrow.  Pain service to titrate his epidural down; however, it will 
not be abruptly discontinued, as he is going to be out of bed today more 
aggressively.  Hep-Lock IV.

 

MTDD

## 2018-04-19 LAB
ANION GAP SERPL CALC-SCNC: 10 MMOL/L (ref 10–20)
BASOPHILS # BLD AUTO: 0 THOU/UL (ref 0–0.2)
BASOPHILS NFR BLD AUTO: 0.2 % (ref 0–1)
BUN SERPL-MCNC: 15 MG/DL (ref 8.4–25.7)
CALCIUM SERPL-MCNC: 8.7 MG/DL (ref 7.8–10.44)
CHLORIDE SERPL-SCNC: 104 MMOL/L (ref 98–107)
CO2 SERPL-SCNC: 27 MMOL/L (ref 23–31)
CREAT CL PREDICTED SERPL C-G-VRATE: 69 ML/MIN (ref 70–130)
EOSINOPHIL # BLD AUTO: 0.1 THOU/UL (ref 0–0.7)
EOSINOPHIL NFR BLD AUTO: 2.3 % (ref 0–10)
GLUCOSE SERPL-MCNC: 89 MG/DL (ref 80–115)
HGB BLD-MCNC: 9.2 G/DL (ref 14–18)
LYMPHOCYTES # BLD: 0.8 THOU/UL (ref 1.2–3.4)
LYMPHOCYTES NFR BLD AUTO: 11.9 % (ref 21–51)
MCH RBC QN AUTO: 31.3 PG (ref 27–31)
MCV RBC AUTO: 92.8 FL (ref 80–94)
MONOCYTES # BLD AUTO: 0.8 THOU/UL (ref 0.11–0.59)
MONOCYTES NFR BLD AUTO: 11.8 % (ref 0–10)
NEUTROPHILS # BLD AUTO: 4.8 THOU/UL (ref 1.4–6.5)
NEUTROPHILS NFR BLD AUTO: 73.8 % (ref 42–75)
PLATELET # BLD AUTO: 85 THOU/UL (ref 130–400)
POTASSIUM SERPL-SCNC: 3.7 MMOL/L (ref 3.5–5.1)
RBC # BLD AUTO: 2.94 MILL/UL (ref 4.7–6.1)
SODIUM SERPL-SCNC: 137 MMOL/L (ref 136–145)
WBC # BLD AUTO: 6.5 THOU/UL (ref 4.8–10.8)

## 2018-04-19 RX ADMIN — Medication SCH ML: at 07:53

## 2018-04-19 RX ADMIN — HYDROCODONE BITARTRATE AND ACETAMINOPHEN PRN TAB: 5; 325 TABLET ORAL at 15:12

## 2018-04-19 RX ADMIN — Medication SCH ML: at 20:51

## 2018-04-19 RX ADMIN — CEFTRIAXONE SCH MLS: 1 INJECTION, POWDER, FOR SOLUTION INTRAMUSCULAR; INTRAVENOUS at 15:12

## 2018-04-19 RX ADMIN — HYDROCODONE BITARTRATE AND ACETAMINOPHEN PRN TAB: 5; 325 TABLET ORAL at 21:44

## 2018-04-19 RX ADMIN — FAMOTIDINE SCH MG: 10 INJECTION, SOLUTION INTRAVENOUS at 20:51

## 2018-04-19 RX ADMIN — FAMOTIDINE SCH MG: 10 INJECTION, SOLUTION INTRAVENOUS at 09:36

## 2018-04-19 NOTE — PRG
DATE OF SERVICE:  04/19/2017

 

SUBJECTIVE:  The patient is doing well, tolerating regular diet, denies nausea, vomiting.  Pain is we
ll controlled with epidural, rate down at 4.  Has yet passed flatus.

 

PHYSICAL EXAMINATION:

VITAL SIGNS:  Stable.  He is afebrile, 97.9, 90, 145/70 , 93, 145/70.  I's and O's intake is 900, out
put is urine 3700.  DARLENE is 42 over /24 hours, last 12 hours 70 mL of mostly serous discharge.

LUNGS:  Clear to auscultation.

ABDOMEN:  Soft, nontender, nondistended.  Incision is clean, dry.  DARLENE is adequately charged.  Bowel s
ounds are active.  No rigidity, no rebound.

EXTREMITIES:  No cyanosis, clubbing or edema or calf tenderness.

 

LABORATORY DATA:  White count 6, hemoglobin 9.2, platelet count has decreased to 85.  Pathology is pe
nding.

 

IMPRESSION AND PLAN:  Mr. Melendez is a 62-year-old male status post left nephroureterectomy with bladd
er cuff, removal of left ureteral stent.  The patient is doing well postop.  He has been aggressively
 out of bed ambulating without issues.  He is encouraged to do so with mechanical DVT prophylaxis and
 dorsiflexion while in bed.  We continue to hold Lovenox due to trending down of his platelets.  His 
epidural can be discontinued today, as I anticipate the patient will most likely be discharged tomorr
ow if labs are stable.  We will check DARLENE creatinine before removal.  We will contact his medical onco
logist, as he does have history of pancytopenia due to neoadjuvant chemotherapy.  Overall, doing well
.

## 2018-04-20 VITALS — SYSTOLIC BLOOD PRESSURE: 119 MMHG | TEMPERATURE: 98.2 F | DIASTOLIC BLOOD PRESSURE: 69 MMHG

## 2018-04-20 LAB
ANION GAP SERPL CALC-SCNC: 8 MMOL/L (ref 10–20)
BASOPHILS # BLD AUTO: 0 THOU/UL (ref 0–0.2)
BASOPHILS NFR BLD AUTO: 0.2 % (ref 0–1)
BUN SERPL-MCNC: 16 MG/DL (ref 8.4–25.7)
CALCIUM SERPL-MCNC: 9.2 MG/DL (ref 7.8–10.44)
CHLORIDE SERPL-SCNC: 104 MMOL/L (ref 98–107)
CO2 SERPL-SCNC: 29 MMOL/L (ref 23–31)
CREAT CL PREDICTED SERPL C-G-VRATE: 67 ML/MIN (ref 70–130)
EOSINOPHIL # BLD AUTO: 0.2 THOU/UL (ref 0–0.7)
EOSINOPHIL NFR BLD AUTO: 3.2 % (ref 0–10)
GLUCOSE SERPL-MCNC: 91 MG/DL (ref 80–115)
HGB BLD-MCNC: 9.3 G/DL (ref 14–18)
LYMPHOCYTES # BLD: 0.9 THOU/UL (ref 1.2–3.4)
LYMPHOCYTES NFR BLD AUTO: 15.3 % (ref 21–51)
MCH RBC QN AUTO: 31.6 PG (ref 27–31)
MCV RBC AUTO: 92.5 FL (ref 80–94)
MONOCYTES # BLD AUTO: 0.7 THOU/UL (ref 0.11–0.59)
MONOCYTES NFR BLD AUTO: 10.8 % (ref 0–10)
NEUTROPHILS # BLD AUTO: 4.3 THOU/UL (ref 1.4–6.5)
NEUTROPHILS NFR BLD AUTO: 70.5 % (ref 42–75)
PLATELET # BLD AUTO: 100 THOU/UL (ref 130–400)
POTASSIUM SERPL-SCNC: 3.4 MMOL/L (ref 3.5–5.1)
RBC # BLD AUTO: 2.93 MILL/UL (ref 4.7–6.1)
SODIUM SERPL-SCNC: 138 MMOL/L (ref 136–145)
WBC # BLD AUTO: 6.1 THOU/UL (ref 4.8–10.8)

## 2018-04-20 RX ADMIN — HYDROCODONE BITARTRATE AND ACETAMINOPHEN PRN TAB: 5; 325 TABLET ORAL at 09:13

## 2018-04-20 RX ADMIN — FAMOTIDINE SCH: 10 INJECTION, SOLUTION INTRAVENOUS at 09:08

## 2018-04-20 RX ADMIN — Medication SCH ML: at 09:10

## 2018-04-20 NOTE — DIS
DATE OF ADMISSION:  04/16/2018

 

DATE OF DISCHARGE:  04/20/2018

 

ADMITTING DIAGNOSES:

1.  History of left ureteral transitional cell carcinoma, with lamina propria 
invasion, squamous differentiation.

2.  History of chronic renal insufficiency.

 

DISCHARGE DIAGNOSES:

1.  History of left ureteral transitional cell carcinoma, with lamina propria 
invasion, squamous differentiation

2.  History of chronic renal insufficiency

3.  Status post left nephroureterectomy with bladder cuff.  Final pathology, 
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 
disease, pathologic T3.

 

DISCHARGE DISPOSITION:  Home with self-care with good family assist.

 

DISCHARGE MEDICATIONS:  Includes ciprofloxacin 250 mg one p.o. b.i.d. for 7 days
, Norco 5/325 #50, Flomax refill prescription provided, Colace 100 mg 1 p.o. 
b.i.d. p.r.n.  He may resume his home medications, advised to hold aspirin and 
ibuprofen products.

 

CONDITION:  Stable.

 

FOLLOWUP:   follow up appointment next Thursday, 04/26/2018 at 2:00 p.m. with 
cystogram to be obtained morning of the appointment.  This has already been 
[facility].

 

ACTIVITY:  No heavy lifting over 10 pounds, no baths, May shower.

 

BRIEF HOSPITAL COURSE:  Mr. Melendez is a pleasant 62-year-old  male who 
presented with left hydronephrosis.  Workup for chronic renal insufficiency 
demonstrated a large left ureteral mass.  This was subsequently biopsied with 
ureteroscopy demonstrating TCC with squamous differentiation.  He underwent 
neoadjuvant chemotherapy, presented this Monday for left nephroureterectomy 
with bladder cuff, removal of ureteral stent.  He did very well postop, as 
epidural was discontinued yesterday and has had minimal narcotic use for pain 
management.  He had mild ileus, started having gas and bowel movement yesterday 
and has been tolerating regular diet.  His creatinine has been stable at 
baseline of 1.4, H&H stable.  I did monitor his CBC as his platelet counts were 
trending down; however, this is stable and it is on trending up. DARLENE is removed 
prior to discharge , due to minimal output 10 mL since this morning.  He will 
continue his indwelling Zeng catheter until cystogram anticipated to be 
removed next week.

 

MTDD

## 2018-04-25 NOTE — PQF
VANCE NOONANPANFILO DO

[id number]                                                             SURG A-
[id number]

[id number]                             

                                   

CLINICAL DOCUMENTATION CLARIFICATION FORM:  POST DISCHARGE



Addendum to original discharge summary date:  4/20/2018_











DATE:   4/25/18                                         ATTN:  Dr. Mckay



Please exercise your independent, professional judgment in responding to the 
clarification form. 

Clinical indicators are provided on the bottom of this form for your review



Please check appropriate box(s):

[  ] Ileus is a postoperative complication related to current surgery



[  ] Ileus is not a postoperative complication related to current surgery



[  ] Other diagnosis (please specify) 



[  ] Unable to determine



In addition, please specify:

Present on Admission (POA):  [  ] Yes             [  ] No             [  ] 
Unable to determine





CLINICAL INDICATORS - SIGNS / SYMPTOMS / LABS

Per discharge summary:  He had mild ileus, started having gas and bowel 
movements yesterday and has been tolerating regular diet.

Per 4/17 progress note: Bowel sounds are present, mildly sluggish.  Poor 
appetite.  Will keep the patient on clear liquids.  

Per 4/18 progress note:  Has not yet passed flatus.  Slowly advance him to full 
liquid diet and monitor.  



RISK FACTORS

Cystoscopy, transurethral incision of left ureteral orifice, removal of left 
ureteral stent, left nephroureterectomy with bladder cuff, partial resection of 
left twelfth rib on 4/16/18.  





TREATMENT:

Per 4/17 and 4/18 progress notes:  Clear liquids with advancement to full 
liquid diet and monitor.  









(This form is maintained as a part of the permanent medical record)

2015 [employer]  All Rights Reserved

 [email or web address]    [phone]

                                                              



MTDD

## 2018-04-26 ENCOUNTER — HOSPITAL ENCOUNTER (OUTPATIENT)
Dept: HOSPITAL 92 - RAD | Age: 63
Discharge: HOME | End: 2018-04-26
Attending: UROLOGY
Payer: COMMERCIAL

## 2018-04-26 DIAGNOSIS — C66.2: Primary | ICD-10-CM

## 2018-04-26 PROCEDURE — 51600 INJECTION FOR BLADDER X-RAY: CPT

## 2018-04-26 PROCEDURE — 74430 CONTRAST X-RAY BLADDER: CPT

## 2018-04-26 NOTE — RAD
CYSTOGRAM:

 

History: Primary transitional cell carcinoma, left ureter. Patient had surgery. This is evaluation fo
r bladder integrity. 

 

FINDINGS: 

Contrast was injected under guidance by Dr. Morejon with good filling of the bladder. There are no s
igns of extravasation. 

 

IMPRESSION: 

Unremarkable post op cystogram. Findings discussed with Dr. Mckay.

 

POS: [facility]

## 2018-04-30 LAB
ANALYZER IN CARDIO: (no result)
BASE EXCESS STD BLDA CALC-SCNC: -1.3 MEQ/L
CA-I BLDA-SCNC: 1.2 MMOL/L (ref 1.12–1.3)
HCO3 BLDA-SCNC: 23.5 MEQ/L (ref 22–26)
HCT VFR BLDA CALC: 32.2 % (ref 42–52)
HGB BLDA-MCNC: 10 G/DL (ref 14–18)
PCO2 BLDA: 39.8 MMHG (ref 35–45)
PH BLDA: 7.39 [PH] (ref 7.35–7.45)
PO2 BLDA: 101.8 MMHG (ref 80–100)
POTASSIUM BLD-SCNC: 3.6 MMOL/L (ref 3.7–5.3)
SPECIMEN DRAWN FROM PATIENT: (no result)

## 2018-07-13 ENCOUNTER — HOSPITAL ENCOUNTER (OUTPATIENT)
Dept: HOSPITAL 92 - RAD | Age: 63
Discharge: HOME | End: 2018-07-13
Attending: UROLOGY
Payer: COMMERCIAL

## 2018-07-13 DIAGNOSIS — N18.3: ICD-10-CM

## 2018-07-13 DIAGNOSIS — N40.1: ICD-10-CM

## 2018-07-13 DIAGNOSIS — N28.9: ICD-10-CM

## 2018-07-13 DIAGNOSIS — C66.2: Primary | ICD-10-CM

## 2018-07-13 PROCEDURE — 88121 CYTP URINE 3-5 PROBES CMPTR: CPT

## 2018-07-13 PROCEDURE — 36415 COLL VENOUS BLD VENIPUNCTURE: CPT

## 2018-07-13 PROCEDURE — 85025 COMPLETE CBC W/AUTO DIFF WBC: CPT

## 2018-07-13 PROCEDURE — 87086 URINE CULTURE/COLONY COUNT: CPT

## 2018-07-13 PROCEDURE — 81001 URINALYSIS AUTO W/SCOPE: CPT

## 2018-07-13 PROCEDURE — 80053 COMPREHEN METABOLIC PANEL: CPT

## 2018-07-13 PROCEDURE — 71046 X-RAY EXAM CHEST 2 VIEWS: CPT

## 2018-07-13 NOTE — RAD
TWO VIEWS OF THE CHEST:

 

INDICATION: 

History of transitional cell carcinoma of the left ureter.

 

COMPARISON: 

Prior exam dated 4/16/18.

 

FINDINGS: 

Right chest wall port and loop recorder is stable.  Mild cardiomegaly persists.  Focal eventration of
 right hemidiaphragm is similar-appearing.  No focal consolidation, pleural effusion, or pneumothorax
 is evident.

 

IMPRESSION: 

No acute abnormality.

 

POS: [facility]

## 2018-07-23 ENCOUNTER — HOSPITAL ENCOUNTER (OUTPATIENT)
Dept: HOSPITAL 92 - BICCT | Age: 63
Discharge: HOME | End: 2018-07-23
Attending: UROLOGY
Payer: COMMERCIAL

## 2018-07-23 DIAGNOSIS — C64.2: Primary | ICD-10-CM

## 2018-07-23 DIAGNOSIS — R91.8: ICD-10-CM

## 2018-07-23 DIAGNOSIS — N18.3: ICD-10-CM

## 2018-07-23 DIAGNOSIS — Z90.5: ICD-10-CM

## 2018-07-23 DIAGNOSIS — I70.90: ICD-10-CM

## 2018-07-23 DIAGNOSIS — N28.9: ICD-10-CM

## 2018-07-23 DIAGNOSIS — N32.9: ICD-10-CM

## 2018-07-23 DIAGNOSIS — R19.09: ICD-10-CM

## 2018-07-23 PROCEDURE — 74176 CT ABD & PELVIS W/O CONTRAST: CPT

## 2018-07-30 ENCOUNTER — HOSPITAL ENCOUNTER (OUTPATIENT)
Dept: HOSPITAL 92 - LABBT | Age: 63
Discharge: HOME | End: 2018-07-30
Attending: UROLOGY
Payer: COMMERCIAL

## 2018-07-30 ENCOUNTER — HOSPITAL ENCOUNTER (OUTPATIENT)
Dept: HOSPITAL 92 - CT | Age: 63
Discharge: HOME | End: 2018-07-30
Attending: UROLOGY
Payer: COMMERCIAL

## 2018-07-30 DIAGNOSIS — I25.10: ICD-10-CM

## 2018-07-30 DIAGNOSIS — C64.2: ICD-10-CM

## 2018-07-30 DIAGNOSIS — C79.89: ICD-10-CM

## 2018-07-30 DIAGNOSIS — C66.2: ICD-10-CM

## 2018-07-30 DIAGNOSIS — R91.8: ICD-10-CM

## 2018-07-30 DIAGNOSIS — Z90.5: ICD-10-CM

## 2018-07-30 DIAGNOSIS — Z01.818: Primary | ICD-10-CM

## 2018-07-30 LAB
ANION GAP SERPL CALC-SCNC: 13 MMOL/L (ref 10–20)
APTT PPP: 33.7 SEC (ref 22.9–36.1)
BUN SERPL-MCNC: 31 MG/DL (ref 8.4–25.7)
CALCIUM SERPL-MCNC: 9.3 MG/DL (ref 7.8–10.44)
CHLORIDE SERPL-SCNC: 107 MMOL/L (ref 98–107)
CO2 SERPL-SCNC: 24 MMOL/L (ref 23–31)
CREAT CL PREDICTED SERPL C-G-VRATE: 0 ML/MIN (ref 70–130)
CRYSTAL-AUWI FLAG: 0 (ref 0–15)
GLUCOSE SERPL-MCNC: 96 MG/DL (ref 80–115)
HEV IGM SER QL: 0.1 (ref 0–7.99)
HGB BLD-MCNC: 13 G/DL (ref 14–18)
HYALINE CASTS #/AREA URNS LPF: (no result) LPF
INR PPP: 1
MCH RBC QN AUTO: 29.8 PG (ref 27–31)
MCV RBC AUTO: 89.8 FL (ref 78–98)
PATHC CAST-AUWI FLAG: 0 (ref 0–2.49)
PLATELET # BLD AUTO: 105 THOU/UL (ref 130–400)
POTASSIUM SERPL-SCNC: 4.9 MMOL/L (ref 3.5–5.1)
PROTHROMBIN TIME: 12.8 SEC (ref 12–14.7)
RBC # BLD AUTO: 4.37 MILL/UL (ref 4.7–6.1)
RBC UR QL AUTO: (no result) HPF (ref 0–3)
SODIUM SERPL-SCNC: 139 MMOL/L (ref 136–145)
SP GR UR STRIP: 1.01 (ref 1–1.04)
SPERM-AUWI FLAG: 0 (ref 0–9.9)
WBC # BLD AUTO: 5 THOU/UL (ref 4.8–10.8)
WBC UR QL AUTO: (no result) HPF (ref 0–3)
YEAST-AUWI FLAG: 0 (ref 0–25)

## 2018-07-30 PROCEDURE — 87086 URINE CULTURE/COLONY COUNT: CPT

## 2018-07-30 PROCEDURE — 93010 ELECTROCARDIOGRAM REPORT: CPT

## 2018-07-30 PROCEDURE — 93005 ELECTROCARDIOGRAM TRACING: CPT

## 2018-07-30 PROCEDURE — 71250 CT THORAX DX C-: CPT

## 2018-07-30 PROCEDURE — 78306 BONE IMAGING WHOLE BODY: CPT

## 2018-07-30 PROCEDURE — A9503 TC99M MEDRONATE: HCPCS

## 2018-07-30 NOTE — CT
CHEST CT WITHOUT CONTRAST:

 

Date:  07/30/18 

 

COMPARISON:  

12/29/17. 

 

HISTORY:  

Squamous cell carcinoma of the left kidney, restaging examination. 

 

TECHNIQUE:  

Serial axial CT imaging is obtained at 5 mm intervals from the lung bases through lung apices without
 contrast. Coronal reformatted imaging obtained. 

 

 

FINDINGS:

The lack of contrast media limits assessment of the imaged viscera, the vascular structures, and for 
lymphadenopathy. There is a right-sided Port-A-Cath, distal tip extending into the region of the cavo
atrial junction. Loop recorder noted within the subcutaneous soft tissues of the left chest wall. 

 

Limited assessment of the chest for lymphadenopathy appears unremarkable. There is no pleural, perica
rdial, or mediastinal fluid seen. There is extensive atherosclerotic calcification of the coronary ar
teries. There is scattered atherosclerotic calcification of the aorta. 

 

The imaged upper abdomen demonstrates no acute findings. There is mild [id number] density within the 
subcutaneous fat in the posterior left flank region, likely associated with prior surgery. 

 

There are numerous nodules within the left lower lobe, the vast majority of which are new when compar
ed to the 12/29/17 exam. This includes a 6.0 mm nodule on image 15, a 7.0 mm nodule on image 36, and 
an 8.0 mm nodule on image 44. Multiple additional smaller left lower lobe pulmonary nodules in the 3.
0 mm range noted. 

 

No discrete pulmonary nodule noted in left upper lobe. 

 

There is a pulmonary nodule in the right upper lobe on image 14 measuring 7-8 mm, new. Additional sma
ller, subcentimeter, new right upper lobe pulmonary nodule noted on image 15 and imag4e 19. No discre
te right middle lobe pulmonary nodule noted. 

 

New subpleural nodule noted in right lower lobe on image 45 measuring 6.0 mm. There is also a 5.0 mm 
new pulmonary nodule noted posteriorly within the right lower lobe on image 30. 

 

There are scattered pulmonary parenchymal cysts noted bilaterally, similar when compared to prior exa
mination. 

 

Review of the osseous structures demonstrates no discrete worrisome lytic or blastic lesion. 

 

IMPRESSION: 

1.  There are multiple new bilateral subcentimeter noncalcified pulmonary nodules, concerning for int
erval development of metastatic disease. 

 

2.  Prominent coronary arterial calcification. 

 

CODE T. 

 

 

 

 

 

POS: [facility]

## 2018-07-30 NOTE — NM
NUCLEAR MEDICINE BONE SCAN WHOLE BODY:

(SKELETAL SCINTIGRAPHY)

 

HISTORY:

Cancer of the left kidney.

 

07/30/2018

 

TECHNIQUE:

IV injection of [id number]:  30.1 millicuries.

3 hour delayed whole body skeletal scintigraphy in anterior and posterior views.

 

FINDINGS:

There are no foci of asymmetrically increased uptake that are particularly suspicious for bone metast
ases.  There is absence of left renal activity.

 

IMPRESSION:

1.  No compelling evidence of skeletal metastasis.

 

2.  Status post left nephrectomy.

 

JN EDOUARD

 

POS: [facility]

## 2018-07-30 NOTE — EKG
Test Reason : 

Blood Pressure : ***/*** mmHG

Vent. Rate : 071 BPM     Atrial Rate : 071 BPM

   P-R Int : 150 ms          QRS Dur : 088 ms

    QT Int : 368 ms       P-R-T Axes : 046 028 027 degrees

   QTc Int : 399 ms

 

Normal sinus rhythm

Normal ECG

When compared with ECG of 09-APR-2018 08:11,

No significant change was found

Confirmed by PRICE KOROMA ([id number]) on 7/30/2018 10:40:55 PM

 

Referred By:  LIBERTAD           Confirmed By:PRICE KOROMA

## 2018-08-02 ENCOUNTER — HOSPITAL ENCOUNTER (OUTPATIENT)
Dept: HOSPITAL 92 - LABBT | Age: 63
Discharge: HOME | End: 2018-08-02
Attending: UROLOGY
Payer: COMMERCIAL

## 2018-08-02 DIAGNOSIS — C64.2: ICD-10-CM

## 2018-08-02 DIAGNOSIS — Z01.812: Primary | ICD-10-CM

## 2018-08-02 PROCEDURE — 86901 BLOOD TYPING SEROLOGIC RH(D): CPT

## 2018-08-02 PROCEDURE — 86850 RBC ANTIBODY SCREEN: CPT

## 2018-08-02 PROCEDURE — 86900 BLOOD TYPING SEROLOGIC ABO: CPT

## 2018-08-08 ENCOUNTER — HOSPITAL ENCOUNTER (OUTPATIENT)
Dept: HOSPITAL 92 - SDC | Age: 63
Discharge: HOME | End: 2018-08-08
Attending: UROLOGY
Payer: COMMERCIAL

## 2018-08-08 VITALS — BODY MASS INDEX: 32.4 KG/M2

## 2018-08-08 DIAGNOSIS — I12.9: ICD-10-CM

## 2018-08-08 DIAGNOSIS — Z79.899: ICD-10-CM

## 2018-08-08 DIAGNOSIS — N40.1: ICD-10-CM

## 2018-08-08 DIAGNOSIS — N18.3: ICD-10-CM

## 2018-08-08 DIAGNOSIS — Z79.82: ICD-10-CM

## 2018-08-08 DIAGNOSIS — M10.9: ICD-10-CM

## 2018-08-08 DIAGNOSIS — E66.9: ICD-10-CM

## 2018-08-08 DIAGNOSIS — N30.90: Primary | ICD-10-CM

## 2018-08-08 DIAGNOSIS — E78.5: ICD-10-CM

## 2018-08-08 DIAGNOSIS — E11.22: ICD-10-CM

## 2018-08-08 PROCEDURE — C1769 GUIDE WIRE: HCPCS

## 2018-08-08 PROCEDURE — 88305 TISSUE EXAM BY PATHOLOGIST: CPT

## 2018-08-08 PROCEDURE — 0TBB8ZX EXCISION OF BLADDER, VIA NATURAL OR ARTIFICIAL OPENING ENDOSCOPIC, DIAGNOSTIC: ICD-10-PCS | Performed by: UROLOGY

## 2018-08-08 PROCEDURE — C1758 CATHETER, URETERAL: HCPCS

## 2018-08-08 NOTE — OP
DATE OF OPERATION:  08/08/2018

 

PREOPERATIVE DIAGNOSES:

1.  A 62-year-old male with history of pathologic T3 squamous cell carcinoma of 
the left ureter renal pelvis, status post left nephroureterectomy with bladder 
cuff.

2.  Chronic renal insufficiency.

3.  Recent staging CT demonstrating retroperitoneal metastatic recurrence.

4.  Recent cystoscopy demonstrating nonspecific 4-5 mm granulomatous lesion of 
the left lateral wall near the bladder neck.

 

POSTOPERATIVE DIAGNOSES:

1.  A 62-year-old male with history of pathologic T3a squamous cell carcinoma 
of the left ureter renal pelvis, status post left nephroureterectomy with 
bladder cuff.

2.  Chronic renal insufficiency.

3.  Recent staging CT demonstrating retroperitoneal metastatic recurrence.

4.  Recent cystoscopy demonstrating nonspecific 4-5 mm granulomatous lesion of 
the left lateral wall near the bladder neck.

 

PROCEDURES PERFORMED:  Cystoscopy, bladder biopsy, TUR  + fulguration of biopsy 
site.

 

SURGEON:  Eleonora Mckay D.O.

 

ANESTHESIA:  General.

 

COMPLICATIONS:  None apparent.

 

DISPOSITION:  To the recovery room in stable condition.

 

DRAINS:  A 22-Guyanese three-way Zeng catheter with 30 mL connected to a gravity 
leg bag.  CBI port plugged.

 

SPECIMEN:  Bladder biopsy.

 

ESTIMATED BLOOD LOSS:  Minimal.

 

INDICATIONS FOR THE PROCEDURE AND HISTORY:  Mr. Melendez is a pleasant 62-year-
old male with history of chronic renal insufficiency, found to have severe left 
hydroureteronephrosis.  He was found to have a large left ureteral mass and 
underwent left nephroureterectomy with bladder cuff, status post neoadjuvant 
chemotherapy.  Final pathology unfortunately demonstrates high-grade squamous 
cell carcinoma of the left ureter renal pelvis.  Recent CT workup demonstrates 
retroperitoneal metastatic recurrence.  He underwent surveillance cystoscopy 
locally, this demonstrated a nonspecific granulomatous lesion at the left 
bladder neck region.  This was inapproachable with a flexible cystoscope and 
local biopsy.  Therefore, he presents today for exam under anesthesia.  The 
patient has been referred to [facility] by Dr. Marley, appointment is yet 
pending and they desired to proceed with further workup with me.  Risks and 
complications and indications for the procedure was reviewed with the patient 
in detail including, but not limited to, bleeding, pain, infection, injury to 
adjacent organs, urosepsis, and ureteral bladder injury.  All questions 
answered to satisfaction and he desired to proceed.

 

DESCRIPTION OF THE PROCEDURE:  After an informed consent is signed, the patient 
is taken to the operating room, placed in a dorsal lithotomy position with the 
genital area prepped and draped in the usual surgical sterile fashion.  Broad-
spectrum antibiotics were provided.  Bilateral LARRY hose, SCDs placed.  A 21-
Guyanese cystoscope was utilized for cystoscopy which demonstrated normal 
anterior and posterior urethra.  Prostatic urethra demonstrated mild BPH 
component.  Upon entering the bladder, again we inspected the bladder with a 30 
and a 70 degree lens.  The right UO demonstrated clear output of urine.  The 
left UO was surgically absent.  There is some nonspecific minimal bullous edema 
of the left bladder neck region adjacent to a granulomatous lesion.  The lesion
  measured approximately 4-5 mm very close to the bladder neck.  I used an 
endoscopic biopsy which was utilized to remove intact.  Even though the lesion 
is quite small and appeared to have a significant arterial supply.  We did 
transition to a gyrus resectoscope.  Using a bladder loop, we cauterized the 
bed of the lesion with good hemostasis.  There was an arterial source applying 
the lesion and this was fulgurated with good hemostasis.  I did resect tissue 
adjacent to it,  suspicion is very low.  It appeared to have normal bladder 
mucosa.  Biopsy was sent.  We did inspect the bladder demonstrating mild oozing 
from the prostatic urethra.  We again inspected the area of the biopsy 
demonstrating good hemostasis.  A 22-Guyanese 30 mL three-way Zeng catheter was 
placed with CBI port plugged.  He will follow up with me sometime next week to 
review pathology.  He is discharged with Norco 5/325, ciprofloxacin until 
followup appointment, Colace p.o. b.i.d. p.r.n.  He is to resume Flomax.

 

TRUPTI

## 2018-10-25 ENCOUNTER — HOSPITAL ENCOUNTER (OUTPATIENT)
Dept: HOSPITAL 92 - ULT | Age: 63
Discharge: HOME | End: 2018-10-25
Attending: INTERNAL MEDICINE
Payer: COMMERCIAL

## 2018-10-25 DIAGNOSIS — I82.432: ICD-10-CM

## 2018-10-25 DIAGNOSIS — I82.412: ICD-10-CM

## 2018-10-25 DIAGNOSIS — M79.605: Primary | ICD-10-CM

## 2018-10-25 DIAGNOSIS — C66.2: ICD-10-CM

## 2018-10-25 DIAGNOSIS — I82.442: ICD-10-CM

## 2018-10-25 DIAGNOSIS — C65.2: ICD-10-CM

## 2018-10-25 DIAGNOSIS — R60.0: ICD-10-CM

## 2018-10-25 PROCEDURE — 82248 BILIRUBIN DIRECT: CPT

## 2018-10-25 PROCEDURE — 36415 COLL VENOUS BLD VENIPUNCTURE: CPT

## 2018-10-25 PROCEDURE — 85730 THROMBOPLASTIN TIME PARTIAL: CPT

## 2018-10-25 PROCEDURE — 84100 ASSAY OF PHOSPHORUS: CPT

## 2018-10-25 PROCEDURE — 80053 COMPREHEN METABOLIC PANEL: CPT

## 2018-10-25 PROCEDURE — 83615 LACTATE (LD) (LDH) ENZYME: CPT

## 2018-10-25 PROCEDURE — 84550 ASSAY OF BLOOD/URIC ACID: CPT

## 2018-10-25 PROCEDURE — 85610 PROTHROMBIN TIME: CPT

## 2018-10-25 NOTE — ULT
LEFT LOWER EXTREMITY VENOUS DOPPLER ULTRASOUND:

10/25/18

 

COMPARISON:  

None.

 

HISTORY: 

Edema, swelling, pain. Assess for DVT.

 

TECHNIQUE:  

Multiplanar gray scale sonographic imaging of the venous structures of the left lower extremity obtai
ebenezer with color flow and spectral analysis.

 

FINDINGS:  

The femoral vein and common femoral vein appear patent with normal compression and blood flow. The le
ft greater saphenous vein appears patent as well. However, the profunda femoral vein is noncompressib
le, consistent with DVT. There is also extensive noncompressible nature of the left popliteal vein wi
th DVT noted within the popliteal vein on gray scale imaging. 

 

The posterior tibial vein on the left is noncompressible, consistent with clot as well.

 

IMPRESSION:  

There is extensive deep venous thrombosis of the left lower extremity involving the profunda femoral 
vein and the popliteal vein with extension into the posterior tibial veins. Results were relayed by ellie bacon performing sonographer to Dr. Marley at 3:40 p.m., 10/25/18.

 

Code CR 

 

POS: [facility]

## 2018-11-15 ENCOUNTER — HOSPITAL ENCOUNTER (INPATIENT)
Dept: HOSPITAL 92 - ERS | Age: 63
LOS: 5 days | Discharge: HOSPICE | DRG: 175 | End: 2018-11-20
Attending: HOSPITALIST | Admitting: HOSPITALIST
Payer: COMMERCIAL

## 2018-11-15 VITALS — BODY MASS INDEX: 30.9 KG/M2

## 2018-11-15 DIAGNOSIS — C78.7: ICD-10-CM

## 2018-11-15 DIAGNOSIS — E11.22: ICD-10-CM

## 2018-11-15 DIAGNOSIS — E43: ICD-10-CM

## 2018-11-15 DIAGNOSIS — R55: ICD-10-CM

## 2018-11-15 DIAGNOSIS — I26.99: Primary | ICD-10-CM

## 2018-11-15 DIAGNOSIS — Z79.899: ICD-10-CM

## 2018-11-15 DIAGNOSIS — E78.5: ICD-10-CM

## 2018-11-15 DIAGNOSIS — M10.9: ICD-10-CM

## 2018-11-15 DIAGNOSIS — N40.0: ICD-10-CM

## 2018-11-15 DIAGNOSIS — E66.9: ICD-10-CM

## 2018-11-15 DIAGNOSIS — Z86.718: ICD-10-CM

## 2018-11-15 DIAGNOSIS — Z86.73: ICD-10-CM

## 2018-11-15 DIAGNOSIS — E11.9: ICD-10-CM

## 2018-11-15 DIAGNOSIS — I95.9: ICD-10-CM

## 2018-11-15 DIAGNOSIS — Z85.54: ICD-10-CM

## 2018-11-15 DIAGNOSIS — E83.52: ICD-10-CM

## 2018-11-15 DIAGNOSIS — I21.4: ICD-10-CM

## 2018-11-15 DIAGNOSIS — R62.7: ICD-10-CM

## 2018-11-15 DIAGNOSIS — Z90.5: ICD-10-CM

## 2018-11-15 DIAGNOSIS — J96.01: ICD-10-CM

## 2018-11-15 DIAGNOSIS — I63.9: ICD-10-CM

## 2018-11-15 DIAGNOSIS — G93.40: ICD-10-CM

## 2018-11-15 DIAGNOSIS — Z79.82: ICD-10-CM

## 2018-11-15 DIAGNOSIS — Z79.01: ICD-10-CM

## 2018-11-15 DIAGNOSIS — C78.00: ICD-10-CM

## 2018-11-15 LAB
ALBUMIN SERPL BCG-MCNC: 2 G/DL (ref 3.4–4.8)
ALP SERPL-CCNC: 128 U/L (ref 40–150)
ALT SERPL W P-5'-P-CCNC: 31 U/L (ref 8–55)
ANION GAP SERPL CALC-SCNC: 10 MMOL/L (ref 10–20)
APTT PPP: 72 SEC (ref 22.9–36.1)
AST SERPL-CCNC: 60 U/L (ref 5–34)
BASOPHILS # BLD AUTO: 0 THOU/UL (ref 0–0.2)
BASOPHILS NFR BLD AUTO: 0 % (ref 0–1)
BILIRUB SERPL-MCNC: 0.9 MG/DL (ref 0.2–1.2)
BUN SERPL-MCNC: 43 MG/DL (ref 8.4–25.7)
CALCIUM SERPL-MCNC: 11.5 MG/DL (ref 7.8–10.44)
CHLORIDE SERPL-SCNC: 108 MMOL/L (ref 98–107)
CK MB SERPL-MCNC: 1 NG/ML (ref 0–6.6)
CO2 SERPL-SCNC: 22 MMOL/L (ref 23–31)
CREAT CL PREDICTED SERPL C-G-VRATE: 0 ML/MIN (ref 70–130)
EOSINOPHIL # BLD AUTO: 0 THOU/UL (ref 0–0.7)
EOSINOPHIL NFR BLD AUTO: 0.1 % (ref 0–10)
GLOBULIN SER CALC-MCNC: 4.8 G/DL (ref 2.4–3.5)
GLUCOSE SERPL-MCNC: 110 MG/DL (ref 80–115)
HGB BLD-MCNC: 10.8 G/DL (ref 14–18)
HGB BLD-MCNC: 9.7 G/DL (ref 14–18)
INR PPP: 4.2
LYMPHOCYTES # BLD: 0.4 THOU/UL (ref 1.2–3.4)
LYMPHOCYTES NFR BLD AUTO: 2.8 % (ref 21–51)
MCH RBC QN AUTO: 26.4 PG (ref 27–31)
MCV RBC AUTO: 85.2 FL (ref 78–98)
MONOCYTES # BLD AUTO: 0.3 THOU/UL (ref 0.11–0.59)
MONOCYTES NFR BLD AUTO: 1.7 % (ref 0–10)
NEUTROPHILS # BLD AUTO: 14.8 THOU/UL (ref 1.4–6.5)
NEUTROPHILS NFR BLD AUTO: 95.5 % (ref 42–75)
PLATELET # BLD AUTO: 107 THOU/UL (ref 130–400)
PLATELET # BLD AUTO: 97 THOU/UL (ref 130–400)
POTASSIUM SERPL-SCNC: 4.1 MMOL/L (ref 3.5–5.1)
PROTHROMBIN TIME: 40.5 SEC (ref 12–14.7)
RBC # BLD AUTO: 4.09 MILL/UL (ref 4.7–6.1)
SODIUM SERPL-SCNC: 136 MMOL/L (ref 136–145)
TROPONIN I SERPL DL<=0.01 NG/ML-MCNC: 0.1 NG/ML (ref ?–0.03)
TROPONIN I SERPL DL<=0.01 NG/ML-MCNC: 0.11 NG/ML (ref ?–0.03)
TROPONIN I SERPL DL<=0.01 NG/ML-MCNC: 0.14 NG/ML (ref ?–0.03)
WBC # BLD AUTO: 15.5 THOU/UL (ref 4.8–10.8)

## 2018-11-15 PROCEDURE — 93005 ELECTROCARDIOGRAM TRACING: CPT

## 2018-11-15 PROCEDURE — 96372 THER/PROPH/DIAG INJ SC/IM: CPT

## 2018-11-15 PROCEDURE — 83880 ASSAY OF NATRIURETIC PEPTIDE: CPT

## 2018-11-15 PROCEDURE — 70551 MRI BRAIN STEM W/O DYE: CPT

## 2018-11-15 PROCEDURE — 85007 BL SMEAR W/DIFF WBC COUNT: CPT

## 2018-11-15 PROCEDURE — 96367 TX/PROPH/DG ADDL SEQ IV INF: CPT

## 2018-11-15 PROCEDURE — 82248 BILIRUBIN DIRECT: CPT

## 2018-11-15 PROCEDURE — 84443 ASSAY THYROID STIM HORMONE: CPT

## 2018-11-15 PROCEDURE — 85610 PROTHROMBIN TIME: CPT

## 2018-11-15 PROCEDURE — 94760 N-INVAS EAR/PLS OXIMETRY 1: CPT

## 2018-11-15 PROCEDURE — 84100 ASSAY OF PHOSPHORUS: CPT

## 2018-11-15 PROCEDURE — 85730 THROMBOPLASTIN TIME PARTIAL: CPT

## 2018-11-15 PROCEDURE — 82553 CREATINE MB FRACTION: CPT

## 2018-11-15 PROCEDURE — 82533 TOTAL CORTISOL: CPT

## 2018-11-15 PROCEDURE — 85025 COMPLETE CBC W/AUTO DIFF WBC: CPT

## 2018-11-15 PROCEDURE — 84484 ASSAY OF TROPONIN QUANT: CPT

## 2018-11-15 PROCEDURE — 83615 LACTATE (LD) (LDH) ENZYME: CPT

## 2018-11-15 PROCEDURE — 71275 CT ANGIOGRAPHY CHEST: CPT

## 2018-11-15 PROCEDURE — 80053 COMPREHEN METABOLIC PANEL: CPT

## 2018-11-15 PROCEDURE — 36415 COLL VENOUS BLD VENIPUNCTURE: CPT

## 2018-11-15 PROCEDURE — 80048 BASIC METABOLIC PNL TOTAL CA: CPT

## 2018-11-15 PROCEDURE — 84550 ASSAY OF BLOOD/URIC ACID: CPT

## 2018-11-15 PROCEDURE — 93306 TTE W/DOPPLER COMPLETE: CPT

## 2018-11-15 PROCEDURE — 96365 THER/PROPH/DIAG IV INF INIT: CPT

## 2018-11-15 PROCEDURE — 85027 COMPLETE CBC AUTOMATED: CPT

## 2018-11-15 RX ADMIN — Medication SCH: at 21:07

## 2018-11-15 NOTE — HP
CHIEF COMPLAINT:  Syncope.

 

HISTORY OF PRESENT ILLNESS:  The patient is a very pleasant 63-year-old male with a history of transi
tional cell carcinoma of the left ureter with metastatic disease to the liver and to the lung who pre
sents to the hospital from Dr. Marley's office after having a syncopal episode.  After talking to t
jordi patient, the patient stated that since , he has not been feeling well.  The patient normally
 goes to [facility] every ; however, the  he did not go to [facility] since he was feeling dizzy 
and from  to today, he has had 2 falls.  The patient denies losing of any consciousness at that
 time.  However, today when he was at Dr. Marley's office to get his chemo infusion, the patient wa
s found to be hypotensive.  At this time, he was given 2 liters of normal saline with some minimal ch
devin in his blood pressure.  Patient then wanted to go to the bathroom; however, when he was taken to
 the bathroom on a wheelchair, the patient had a syncopal episode while he was sitting on the chair. 
 According to the patient's sister who is at the bedside, she stated that he completely passed out wh
ich lasted about 30 seconds.  There was no mention of any shakiness or tongue biting.  At this time, 
the patient was transferred to the ER for further evaluation.  On ER evaluation, the patient was foun
d to be hypotensive and blood pressures in the 97/60.  MAPS were barely around 65-68.  Patient mariela larios denies any chest pain or shortness of breath; however, he is on 6 liters of nasal cannula, sattin
g about 90%, normally does not use oxygen at all.  Patient recently had a DVT which was diagnosed in 
2018, left lower extremity significant DVT.  Patient was put on Xarelto and he has been t
aking Xarelto twice a day, took his a.m. dose this morning.

 

PAST MEDICAL HISTORY:  His past medical history is as the following:  History of benign prostatic hyp
ertrophy, history of CVA last year, hypertension, he had a CVA to the PCA distribution, diabetes type
 2, obesity, hyperlipidemia, history of gout, and also has a history of transitional cell carcinoma o
f the left ureter.

 

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  Medtro
steff loop event recorder 2017, cystoscopy with left retrograde stent with stool in situ, uretero
scopy and ureteral biopsy, MediPort placement in 2018, cystoscopy, _____ bladder tumor on 2018.
  He had a cystoscopy also of the right ureter again on .

 

FAMILY HISTORY:  Father , diagnosed with diabetes, hypertension, and heart disease.  Mother e
sophageal cancer.  Siblings and brothers had colon polyp.

 

SOCIAL HISTORY:  He is a nonsmoker, does not drink.  No alcohol.  He is currently , lives with
 his spouse.  Occupation:  He is a .

 

His medications are as of the following, this is gained from the chart.  The patient does not have a 
list.  He is on hydralazine 100 mg 3 times a day, carvedilol 12.5 twice a day, amlodipine 10 mg daily
.  He is on allopurinol 100 mg 2 tabs once a day, Flomax 0.4 mg daily, atorvastatin 20 mg daily, aspi
rin 325 daily, and Colace 1 b.i.d. p.r.n.

 

REVIEW OF SYSTEMS:  All negative except for the ones mentioned above in the HPI.

 

LABORATORY RESULTS:  As of the following, his INR is 4.2, PTT is 72.0.  WBC 15.5, hemoglobin of 10.8,
 hematocrit of 34.8.  His platelets are 107.  Chemistry:  Sodium of 136, potassium of 4.1, BUN of 43,
 creatinine of 1.44.  His calcium is 11.5.  AST is 60.  His troponin is mildly elevated at 0.135.  Pa
chintan did have a CTA of the chest which indicated he has bilateral pulmonary embolism, also has metas
tatic lung disease.  He also has metastatic hilar adenopathy and bilateral loculated effusions are no
felix.  His thrombus involves the main pulmonary artery extending into both the right and left pulmonar
y artery trunks.

 

ASSESSMENT AND PLAN:  The patient is a very pleasant 63-year-old male who presents to the hospital wi
th syncope.

1.  Acute pulmonary embolism.  Hemodynamically unstable.  Patient's blood pressure is very minimal.  
He has received 2 liters of normal saline.  We will start him on some fluids.  Also, I did speak with
 intensivist for possible TPA candidate; however, the patient took his Xarelto earlier today which is
 a contraindication.  We will also get a CTA of brain.  We will start patient on Lovenox at a full do
se and also start him on heparin.  His INR is elevated; however, I do not think this is accurate.  Mo
st likely, this could be secondary to his underlying liver metastasis.  He does have mildly elevated 
AST and he does have cancer, so I believe this is not therapeutic INR.  We will start him on Lovenox 
and also start him on heparin.  We will keep TPA close by just in case if patient needs this down the
 road.  I have talked to the patient and the patient's sister who is at the bedside in regard to how 
significantly ill he is.  The patient still continues and wants to be FULL CODE from now.  Again, the
 patient is very critically ill.

2.  Mildly elevated leukocytosis.  We will put the patient on Zosyn; however, I do not think there is
 an underlying infection, but we will just put him on antibiotics for now because the patient did men
tion some loculated effusions.  We will continue to monitor.

3.  Acute hypoxic or hypoxemic respiratory failure.  He is not on any oxygen.  This is most likely se
condary to his pulmonary embolism.

4.  Hypotension.  Again, this is most likely secondary to pulmonary embolism.  Most likely, he does h
ave RV straining and echocardiogram has been ordered.  He does have mildly elevated troponins.

5.  Transitional cell carcinoma.  Again, the patient is aware that he does have metastatic disease to
 his lung; however, he was not aware that he had some liver lesions also which I have notified the eliseo woodson.

 

The patient is very critically ill and I have discussed this with the intensivist and I have discusse
d with the patient's family and patient about his overall medical condition.

## 2018-11-15 NOTE — CT
CTA CHEST WITH CONTRAST:

11/15/18

 

Multiple axial tomograms obtained through the chest with IV enhancement following a pulmonary angio p
rotocol with multiplanar reconstructions and 3D post processing.

 

INDICATIONS: 

Syncope. Renal cancer.

 

Comparison made to a recent chest CT of 7/30/18. That exam revealed numerous small pulmonary nodules 
which are subcentimeter. Early metastatic disease to the lungs is suspected. 

 

FINDINGS:  

On today's exam there is a large linear filling defect consistent with thrombus involving the main pu
lmonary artery extending into both right and left pulmonary artery trunks. Thrombus extends into both
 upper lobe pulmonary arteries and into the descending left main pulmonary artery. Loculated  pleural
 fluid is seen bilaterally with moderate sized left  effusion. There are now numerous pulmonary mai
s which  have significantly  enlarged and increased in number when compared to the prior exam indicat
ing progression of lung metastatic disease. Mediastinal adenopathy is prominent. 

 

Images through the upper abdomen reveal abnormal lesions involving the liver which appeared new and w
ould be consistent with metastatic disease to the liver. These lesions are too numerous to count. 

 

IMPRESSION:  

1.      Bilateral pulmonary emboli as described above. 

2.      Progression of pulmonary metastatic disease with numerous enlarging pulmonary nodules since p
rior exam. There is associated mediastinal and hilar adenopathy. 

Bilateral pleural effusions which appear loculated. 

3.      Diffuse metastatic disease to the liver is now noted. 

4.      Findings relayed to Dr. Marino at time of dictation.

 

POS: [facility]

## 2018-11-16 LAB
ALBUMIN SERPL BCG-MCNC: 1.9 G/DL (ref 3.4–4.8)
ALP SERPL-CCNC: 118 U/L (ref 40–150)
ALT SERPL W P-5'-P-CCNC: 31 U/L (ref 8–55)
ANION GAP SERPL CALC-SCNC: 10 MMOL/L (ref 10–20)
APTT PPP: (no result) SEC (ref 22.9–36.1)
APTT PPP: 145.5 SEC (ref 22.9–36.1)
APTT PPP: 178.4 SEC (ref 22.9–36.1)
APTT PPP: 231.4 SEC (ref 22.9–36.1)
AST SERPL-CCNC: 54 U/L (ref 5–34)
BILIRUB SERPL-MCNC: 0.9 MG/DL (ref 0.2–1.2)
BUN SERPL-MCNC: 42 MG/DL (ref 8.4–25.7)
CALCIUM SERPL-MCNC: 11.4 MG/DL (ref 7.8–10.44)
CHLORIDE SERPL-SCNC: 111 MMOL/L (ref 98–107)
CO2 SERPL-SCNC: 23 MMOL/L (ref 23–31)
CREAT CL PREDICTED SERPL C-G-VRATE: 61 ML/MIN (ref 70–130)
GLOBULIN SER CALC-MCNC: 4.6 G/DL (ref 2.4–3.5)
GLUCOSE SERPL-MCNC: 121 MG/DL (ref 80–115)
HGB BLD-MCNC: 10.1 G/DL (ref 14–18)
MCH RBC QN AUTO: 26.6 PG (ref 27–31)
MCV RBC AUTO: 85.8 FL (ref 78–98)
MDIFF COMPLETE?: YES
PLATELET # BLD AUTO: 97 THOU/UL (ref 130–400)
PLATELET BLD QL SMEAR: (no result)
POTASSIUM SERPL-SCNC: 4.1 MMOL/L (ref 3.5–5.1)
RBC # BLD AUTO: 3.8 MILL/UL (ref 4.7–6.1)
SODIUM SERPL-SCNC: 140 MMOL/L (ref 136–145)
WBC # BLD AUTO: 13.9 THOU/UL (ref 4.8–10.8)

## 2018-11-16 RX ADMIN — Medication SCH ML: at 23:35

## 2018-11-16 RX ADMIN — Medication SCH ML: at 17:50

## 2018-11-16 NOTE — CON
DATE OF CONSULTATION:  11/16/2018

 

REASON FOR CONSULTATION:  Urothelial cancer.

 

HISTORY OF PRESENT ILLNESS:  Mr. Melendez is a pleasant 63-year-old male with high grade urothelial can
cer who had a recent progression in 09/2018.  He was started on immunotherapy consisting of Keytruda 
and completed 2 cycles.  He presented to our clinic yesterday for cycle 3.  He had complaints of weak
ness, fatigue, dizziness, poor appetite and nosebleeds.  He had recently started Xarelto for a left l
ower extremity DVT on 10/25/2018.  His blood pressure yesterday was 78/50.  He had taken his amlodipi
ne, Coreg and hydralazine that morning prior to arrival.  He received 2 liters of IV fluids and some 
steroids.  When he got up to use the bathroom, he unfortunately had a syncopal episode with loss of c
onsciousness.  He did arouse quickly once he was back in supine position.  He was brought in by EMS t
o the emergency room for further evaluation and treatment.  He had a CT angio of the chest which show
ed bilateral pulmonary emboli.  There were pulmonary nodules consistent with diffuse metastatic disea
se in the liver.  He was admitted and started on a heparin drip.  His blood pressure has responded, b
ut remains in the ICU.

 

PAST MEDICAL HISTORY:

1.  High grade metastatic urothelial carcinoma, squamous cell type.

2.  Diabetes mellitus 2.

3.  Hypertension.

4.  Hyperlipidemia.

5.  Cerebrovascular accident.

6.  Deep venous thrombosis.

7.  Chronic kidney disease.

8.  Transient ischemic attack.

 

PAST SURGICAL HISTORY:

1.  Cystoscopy.

2.  Ureteroscopy.

3.  MediPort.

 

ALLERGIES:  No known drug allergies.

 

HOME MEDICATIONS:

1.  Coreg 12.5 mg daily.

2.  Allopurinol 200 mg daily.

3.  Lipitor 20 mg daily.

4.  Flomax 0.4 mg daily.

5.  Hydralazine 100 mg t.i.d.

6.  Xarelto 20 mg daily.

7.  Amlodipine 10 mg daily.

 

FAMILY HISTORY:  Mother had esophageal cancer, sister had breast cancer.

 

SOCIAL HISTORY:  , has 2 children, lives with his spouse.  No alcohol, tobacco or illicit drug
 use.

 

REVIEW OF SYSTEMS:  Twelve point review of system is negative except for noted in HPI.

 

PHYSICAL EXAMINATION:

VITAL SIGNS:  Temperature is 97.6, pulse is 79, respiratory rate 25, BP is 114/57.  He is 95% on 2 li
ters.

GENERAL:  Well-developed, well-nourished male in no acute distress.

HEENT:  Normocephalic, atraumatic.  Pupils equal and reactive to light.

NECK:  Supple.

CARDIOVASCULAR:  Regular rate and rhythm.

LUNGS:  Clear anterior.

ABDOMEN:  Soft, nontender, bowel sounds are positive.

EXTREMITIES:  He has 1+ bilateral lower extremity edema.

SKIN:  No rash.

HEMATOLOGIC:  No petechia or purpura.

NEUROLOGIC:  Nonfocal.

PSYCHIATRIC:  The patient is more alert and answers questions appropriately.

 

PERTINENT LABORATORY AND X-RAYS:  Current WBCs are 13.9, hemoglobin 10.1, hematocrit 32.6, platelet c
ount is 97,000.  He has got 70% neutrophils, 25% bands, 2% lymphocytes.  Sodium is 140, potassium 4.1
, chloride 111, CO2 is 23, BUN is 42, creatinine 1.54, calcium is 11.4, total bilirubin is 0.9, AST i
s 54, ALT 31, alkaline phosphatase is 118.  BNP is 181.  Troponin is 0.108, serum total protein is 6.
5, albumin 1.9, globulin 4.6.

 

IMPRESSION:

1.  Metastatic urothelial carcinoma on immunotherapy with Keytruda.

2.  Bilateral pulmonary embolus.

3.  Recent deep venous thrombosis.

4.  Hypercalcemia.

 

DISCUSSION:  The patient has been started on heparin drip.  Dr. Sanchez is considering tPA at some po
int.  We will leave the anticoagulation to the critical care doctors.  He has received IV fluids and 
his calcium has improved.  We will consider Zometa, if calcium is not better with IV fluids in the ne
xt 24 hours.  He only received 2 cycles of immunotherapy.  Plan to resume once he has recovered from 
this acute illness.

 

Thank you for the consult.  We will follow him closely.

## 2018-11-16 NOTE — EKG
Test Reason : 

Blood Pressure : ***/*** mmHG

Vent. Rate : 071 BPM     Atrial Rate : 071 BPM

   P-R Int : 150 ms          QRS Dur : 074 ms

    QT Int : 378 ms       P-R-T Axes : 033 019 005 degrees

   QTc Int : 410 ms

 

Normal sinus rhythm

Nonspecific T wave abnormality

Abnormal ECG

 

Confirmed by RAMON RAVI ([id number]),  ROSALINA CHIANG ([id number]) on 11/16/2018 5:04:32 PM

 

Referred By:             Confirmed By:RAMON RAVI

## 2018-11-16 NOTE — PDOC.PN
- Subjective


Encounter Start Date: 11/16/18


Encounter Start Time: 08:40


Subjective: awake, no sob or chest pain


-: feels better





- Objective


MAR Reviewed: Yes


Vital Signs & Weight: 


 Vital Signs (12 hours)











  Temp Pulse Ox


 


 11/16/18 08:00   95


 


 11/16/18 07:25   97


 


 11/16/18 07:00  97.6 F 


 


 11/16/18 04:00  98.1 F 








 Weight











Weight                         197 lb 1.6 oz











 Most Recent Monitor Data











Heart Rate from ECG            81


 


NIBP                           122/68


 


NIBP BP-Mean                   86


 


Respiration from ECG           26


 


SpO2                           94














I&O: 


 











 11/15/18 11/16/18 11/17/18





 06:59 06:59 06:59


 


Intake Total  907 400


 


Output Total  4 0


 


Balance  903 400











Result Diagrams: 


 11/16/18 02:59





 11/16/18 02:59





Phys Exam





- Physical Examination


HEENT: PERRLA, sclera anicteric


Neck: no JVD, supple


Respiratory: no wheezing, no rales


Cardiovascular: RRR, no significant murmur


Gastrointestinal: soft, no distention, positive bowel sounds


Musculoskeletal: no edema, pulses present


Neurological: non-focal, moves all 4 limbs





Dx/Plan


(1) Pulmonary embolism


Code(s): I26.99 - OTHER PULMONARY EMBOLISM WITHOUT ACUTE COR PULMONALE   Status

: Acute   


Qualifiers: 


   Pulmonary embolism type: saddle   Chronicity: acute   Acute cor pulmonale 

presence: without acute cor pulmonale   Qualified Code(s): I26.92 - Saddle 

embolus of pulmonary artery without acute cor pulmonale   





(2) Transitional cell carcinoma


Code(s): C68.9 - MALIGNANT NEOPLASM OF URINARY ORGAN, UNSPECIFIED   Status: 

Chronic   Comment: with multiple mets to liver and lungs   





(3) Multiple lesions of metastatic malignancy


Code(s): C79.9 - SECONDARY MALIGNANT NEOPLASM OF UNSPECIFIED SITE   Status: 

Chronic   





(4) Syncope


Code(s): R55 - SYNCOPE AND COLLAPSE   Status: Acute   Comment: sec to PE   





(5) severe hypoalbuminemia


Status: Chronic   





(6) Diabetes mellitus


Code(s): E11.9 - TYPE 2 DIABETES MELLITUS WITHOUT COMPLICATIONS   Status: 

Chronic   


Qualifiers: 


   Diabetes mellitus type: type 2   Diabetes mellitus long term insulin use: 

without long term use   Diabetes mellitus complication status: with unspecified 

complications   Qualified Code(s): E11.8 - Type 2 diabetes mellitus with 

unspecified complications   





(7) Hyperlipidemia


Code(s): E78.5 - HYPERLIPIDEMIA, UNSPECIFIED   Status: Chronic   


Qualifiers: 


   Hyperlipidemia type: unspecified   Qualified Code(s): E78.5 - Hyperlipidemia

, unspecified   





- Plan


is on heparin drip


-: xarelto failure


-: poor prognosis with multiple mets from urothelial cancer


-: d/w . Code status d/w patient, wants to be full code


-: echo results noted





* .








Review of Systems





- Medications/Allergies


Allergies/Adverse Reactions: 


 Allergies











Allergy/AdvReac Type Severity Reaction Status Date / Time


 


No Known Allergies Allergy   Verified 12/11/17 10:56











Medications: 


 Current Medications





Acetaminophen (Tylenol)  650 mg IA Q6H PRN


   PRN Reason: Fever > 101 or Mild Pain 1-3


Alteplase, Recombinant (Activase)  0 mg IVP WILLCALL [facility]


   Stop: 11/16/18 20:46


Bisacodyl (Dulcolax)  10 mg PO DAILYPRN PRN


   PRN Reason: Constipation


Dextrose/Water (Dextrose 50%)  25 gm SLOW IVP PRN PRN


   PRN Reason: Hypoglycemia


Glucagon (Glucagon)  1 mg IM PRN PRN


   PRN Reason: Hypoglycemia


Heparin Sodium (Porcine) (Heparin 1,000 Units/Ml (10 Ml))  0 units SLOW IVP 

ASDIR [facility]; Protocol


Piperacillin Sod/Tazobactam (Sod 3.375 gm/ Sodium Chloride)  100 mls @ 200 mls/

hr IVPB Q6HR [facility]


   Last Admin: 11/16/18 06:16 Dose:  100 mls


Heparin Sodium/Dextrose (Heparin 25,000 Units/D5w 500 Ml)  500 mls @ 0 mls/hr 

IVPB INF [facility]; Protocol


Dextrose/Water (D5w)  1,000 mls @ 0 mls/hr IV .Q0M PRN


   PRN Reason: Hypoglycemia


Potassium Chloride 40 meq/ (Sodium Chloride)  270 mls @ 135 mls/hr IVPB ASDIR 

PRN


   PRN Reason: FOR SERUM K+ 2.5 - 3.5


Potassium Chloride 40 meq/ (Device)  100 mls @ 50 mls/hr IVPB ASDIR PRN


   PRN Reason: FOR SERUM K+ 2.5 - 3.5


Magnesium Sulfate 1 gm/ Sodium (Chloride)  102 mls @ 102 mls/hr IV PRN PRN


   PRN Reason: MAG LEVEL 1.4 - 2.0


Magnesium Sulfate 2 gm/ Device  100 mls @ 100 mls/hr IVPB ASDIR PRN


   PRN Reason: MAGNESIUM < 1.4


Potassium Phosphate 9 mmol/ (Sodium Chloride)  103 mls @ 25.75 mls/hr IVPB 

ASDIR PRN


   PRN Reason: Phosphate 1.0-1.8


Potassium Phosphate 12 mmol/ (Sodium Chloride)  254 mls @ 63.5 mls/hr IV ASDIR 

PRN


   PRN Reason: Serum phosphate 0.5-0.9


Potassium Phosphate 15 mmol/ (Sodium Chloride)  255 mls @ 63.75 mls/hr IV ASDIR 

PRN


   PRN Reason: Serum Phos < 0.5


Sodium Chloride (1/2 Normal Saline)  1,000 mls @ 75 mls/hr IV .[id number] [facility]


   Last Admin: 11/16/18 10:27 Dose:  Not Given


Insulin Human Lispro (Humalog)  0 units SC .MILD SLIDING SCALE PRN


   PRN Reason: Mild Correctional Scale


Magnesium Oxide (Magnesium Oxide)  400 mg PO BIDPRN PRN


   PRN Reason: FOR SERUM MAG 1.4 - 2.0


Magnesium Oxide (Magnesium Oxide)  800 mg PO PRN PRN


   PRN Reason: FOR SERUM MAG < 1.4


Miscellaneous Medication (Ccu Electrolyte Replacement)  1 each IVPB ONE [facility]


   Stop: 12/15/18 17:41


Miscellaneous Medication (Phos-Nak)  1 pkt PO TIDPRN PRN


   PRN Reason: FOR PHOS LEVEL 1.0 - 1.8


Miscellaneous Medication (Phos-Nak)  2 pkt PO TIDPRN PRN


   PRN Reason: FOR PHOS LEVEL 0.5 - 1.0


Ccu Electrolyte (Replacement Protocol)  0 each FS PRN PRN


   PRN Reason: FOR ELECTROLYTE REPLACEMENT


Potassium Chloride (K-Dur)  40 meq PO ASDIR PRN


   PRN Reason: FOR SERUM K+  2.5 - 3.5


Potassium Chloride (Klor-Con)  40 meq PER TUBE ASDIR PRN


   PRN Reason: FOR SERUM K+ 2.5-3.5


Sodium Chloride (Flush - Normal Saline)  10 ml IVF Q12HR [facility]


   Last Admin: 11/15/18 21:07 Dose:  Not Given


Sodium Chloride (Flush - Normal Saline)  10 ml IVF PRN PRN


   PRN Reason: Saline Flush

## 2018-11-16 NOTE — CON
DATE OF CONSULTATION:  11/16/2018

 

SERVICE:  Pulmonary Medicine

 

REASON FOR CONSULTATION:  ICU patient.

 

HISTORY OF PRESENT ILLNESS:  The patient is a 63-year-old  male with 
past medical history significant for widely metastatic transitional cell 
carcinoma.  He was in his usual state of health when he started having 
increasing shortness of breath, and syncopal and presyncopal episodes, and 
increasing lethargy.  [facility].  He 
stood up and had a syncopal event and was subsequently brought here.  He was 
discovered to be hypoxemic, and his blood pressures were marginal at best.  He 
was on Xarelto because of a known DVT that was diagnosed in 10/2018.  His last 
dose was Friday morning.  It was a 15 mg dose because he was on his low period.

 

PAST MEDICAL HISTORY:

1.  History of cerebrovascular accident with greater than 1 year ago.

2.  Hypertension.

3.  Benign prostatic hypertrophy.

4.  Type 2 diabetes mellitus.

5.  Dyslipidemia.

6.  Obesity.

7.  Gout.

8.  Transition cell carcinoma, widely metastatic.

 

PAST SURGICAL HISTORY:

1.  Right eye tumor removed at age 11.

2.  Loop event placement in 07/2017.

3.  Cystoscopy with retrograde stent.

4.  Ureteral biopsy.

5.  MediPort placement.

6.  Cystoscopy with resection of bladder tumor.

 

FAMILY HISTORY:  Noncontributory.

 

SOCIAL HISTORY:  He does not use any alcohol or tobacco.  He denies any illicit 
drugs.  He has no exposure to chemicals, dust, asbestos or tuberculosis.  He 
drives for a living.  

 

ALLERGIES:  No known drug allergies.

 

MEDICATIONS:  List of his inpatient medications were reviewed.  No specific 
updates were made at this time.

 

REVIEW OF SYSTEMS:  General, head, ears, eyes, nose, throat, cardiovascular, 
respiratory, GI, , musculoskeletal, neurologic and skin is negative except as 
mentioned in the HPI.

 

PHYSICAL EXAMINATION:

VITAL SIGNS:  Afebrile, pulse 97, blood pressure 122/69, respirations 16, 
saturation 91% on 3 liters nasal cannula.

GENERAL:  The patient is awake, alert, no apparent distress.

LUNGS:  Excellent air entry.  There is no prolonged expiratory phase.  I do not 
appreciate any wheezing, rhonchi, or crackles.

HEART:  Normal rate, regular.

ABDOMEN:  Soft, nontender, nondistended.  Bowel sounds are positive.

MUSCULOSKELETAL:  No cyanosis or clubbing.  There is 1-2+ pitting in the left 
lower extremity.  No pitting in the right lower extremity.

GENITOURINARY:  No Zeng.

NEUROLOGIC:  Grossly nonfocal.

 

LABORATORY DATA:  WBC 13.9, hemoglobin 10.1, platelets 97,000.  Band count is 25
%.  INR 4.2, PTT 96.  Troponin is stable to slightly up trending at 0.108.  
Creatinine 1.54, which is at his baseline.  Basic metabolic profile is 
otherwise unremarkable.

 

IMAGING:  CTA of the chest demonstrates extensive bilateral pulmonary emboli 
including a saddle embolus.  There are bilateral pleural effusions, and 
evidence of metastatic disease throughout the lung.  There is no significant 
reflux of contrast into the inferior vena cava.  The right ventricle is 
generous in size.  That being said, it does not appear to cause any compression 
of the left ventricle.  Atelectasis is present in the left lower lobe.

 

ASSESSMENT:

1.  Acute hypoxic respiratory failure.

2.  Acute pulmonary embolism.

3.  Syncope.

4.  Transition cell carcinoma, widely metastatic.

5.  Chronic kidney disease.

6.  Non-ST elevation myocardial infarction,

 

DISCUSSION AND PLAN:  I will get a BNP  this morning, and tomorrow morning.  I 
will also repeat a troponin.  If it appears that the troponin or the BNP are up 
trending, we can certainly consider him for t-PA in the future.  At this point, 
however, because of his absolute contraindication to t-PA (Xarelto), we are 
stuck with giving him anticoagulation and nothing more.  We placed a vial of t-
PA at bedside just in case the patient has a coding event.  If that occurs, we 
will initiate the code and give him the t-PA as quickly as possible.  Thankfully
; however, overnight the patient's oxygen requirements have gently improved, 
and his blood pressures have firmed up very nicely.  I will continue his 
empiric antibiotics for the time being.  If he fails to clear his sepsis profile
, thoracentesis may be indicated in a couple of days.  Pulmonary Critical Care 
will continue to follow along for now.  I would like for him to remain in the 
ICU until his right heart strain is clearly improving.

 

70 minutes have been devoted to this patient in various activities.  I 
personally reviewed all imaging studies and laboratory data noted within this 
document.  For fifty percent of this time, I was interacting with the patient 
at the bedside or coordinating care with the care team.  For the remainder of 
the time I was immediately available to the patient in the hospital unit.  



TRUPTI

## 2018-11-17 LAB
ALBUMIN SERPL BCG-MCNC: 1.9 G/DL (ref 3.4–4.8)
ALP SERPL-CCNC: 123 U/L (ref 40–150)
ALT SERPL W P-5'-P-CCNC: 30 U/L (ref 8–55)
ANION GAP SERPL CALC-SCNC: 13 MMOL/L (ref 10–20)
APTT PPP: 189.7 SEC (ref 22.9–36.1)
AST SERPL-CCNC: 56 U/L (ref 5–34)
BILIRUB SERPL-MCNC: 0.8 MG/DL (ref 0.2–1.2)
BUN SERPL-MCNC: 37 MG/DL (ref 8.4–25.7)
CALCIUM SERPL-MCNC: 11.4 MG/DL (ref 7.8–10.44)
CHLORIDE SERPL-SCNC: 112 MMOL/L (ref 98–107)
CO2 SERPL-SCNC: 21 MMOL/L (ref 23–31)
CREAT CL PREDICTED SERPL C-G-VRATE: 66 ML/MIN (ref 70–130)
GLOBULIN SER CALC-MCNC: 4.3 G/DL (ref 2.4–3.5)
GLUCOSE SERPL-MCNC: 127 MG/DL (ref 80–115)
HGB BLD-MCNC: 10 G/DL (ref 14–18)
MCH RBC QN AUTO: 26.4 PG (ref 27–31)
MCV RBC AUTO: 85.8 FL (ref 78–98)
MDIFF COMPLETE?: YES
PLATELET # BLD AUTO: 84 THOU/UL (ref 130–400)
PLATELET BLD QL SMEAR: (no result)
POLYCHROMASIA BLD QL SMEAR: (no result) (100X)
POTASSIUM SERPL-SCNC: 3.6 MMOL/L (ref 3.5–5.1)
RBC # BLD AUTO: 3.8 MILL/UL (ref 4.7–6.1)
SODIUM SERPL-SCNC: 142 MMOL/L (ref 136–145)
TROPONIN I SERPL DL<=0.01 NG/ML-MCNC: 0.08 NG/ML (ref ?–0.03)
WBC # BLD AUTO: 19.5 THOU/UL (ref 4.8–10.8)

## 2018-11-17 RX ADMIN — Medication SCH ML: at 22:53

## 2018-11-17 RX ADMIN — Medication SCH ML: at 16:30

## 2018-11-17 RX ADMIN — Medication SCH ML: at 09:14

## 2018-11-17 NOTE — PDOC.PN
- Subjective


Encounter Start Date: 11/17/18


Encounter Start Time: 10:30


Subjective: is awake, no sob or chest pain


-: wife at bedside





- Objective


MAR Reviewed: Yes


Vital Signs & Weight: 


 Vital Signs (12 hours)











  Temp Pulse Ox


 


 11/17/18 08:00  97.6 F  92 L


 


 11/17/18 04:00  98.0 F 








 Weight











Admit Weight                   197 lb


 


Weight                         198 lb 6.656 oz











 Most Recent Monitor Data











Heart Rate from ECG            73


 


NIBP                           128/66


 


NIBP BP-Mean                   86


 


Respiration from ECG           25


 


SpO2                           94














I&O: 


 











 11/16/18 11/17/18 11/18/18





 06:59 06:59 06:59


 


Intake Total 907 3515 60


 


Output Total 4 901 


 


Balance 903 2614 60











Result Diagrams: 


 11/17/18 02:42





 11/17/18 02:42





Phys Exam





- Physical Examination


HEENT: PERRLA, moist MMs


Neck: no JVD, supple


Respiratory: no wheezing, no rales


Cardiovascular: RRR, no significant murmur


Gastrointestinal: soft, non-tender, positive bowel sounds


Musculoskeletal: pulses present, edema present


Neurological: non-focal, moves all 4 limbs





Dx/Plan


(1) Pulmonary embolism


Code(s): I26.99 - OTHER PULMONARY EMBOLISM WITHOUT ACUTE COR PULMONALE   Status

: Acute   


Qualifiers: 


   Pulmonary embolism type: saddle   Chronicity: acute   Acute cor pulmonale 

presence: without acute cor pulmonale   Qualified Code(s): I26.92 - Saddle 

embolus of pulmonary artery without acute cor pulmonale   





(2) Transitional cell carcinoma


Code(s): C68.9 - MALIGNANT NEOPLASM OF URINARY ORGAN, UNSPECIFIED   Status: 

Chronic   Comment: with multiple mets to liver and lungs   





(3) Multiple lesions of metastatic malignancy


Code(s): C79.9 - SECONDARY MALIGNANT NEOPLASM OF UNSPECIFIED SITE   Status: 

Chronic   





(4) Syncope


Code(s): R55 - SYNCOPE AND COLLAPSE   Status: Acute   Comment: sec to PE   





(5) severe hypoalbuminemia


Status: Chronic   





(6) Diabetes mellitus


Code(s): E11.9 - TYPE 2 DIABETES MELLITUS WITHOUT COMPLICATIONS   Status: 

Chronic   


Qualifiers: 


   Diabetes mellitus type: type 2   Diabetes mellitus long term insulin use: 

without long term use   Diabetes mellitus complication status: with unspecified 

complications   Qualified Code(s): E11.8 - Type 2 diabetes mellitus with 

unspecified complications   





(7) Hyperlipidemia


Code(s): E78.5 - HYPERLIPIDEMIA, UNSPECIFIED   Status: Chronic   


Qualifiers: 


   Hyperlipidemia type: unspecified   Qualified Code(s): E78.5 - Hyperlipidemia

, unspecified   





- Plan


is off heparin, now on eliquis


-: hemostable


-: oral solid diet, tx to onc floor


-: d/w wife and patient at bedside, is on keytruda as no clinical trial option


-: -s were available [facility] his cancer at [facility]. He is also a bit confused 





* .


from 3 weeks and wife thinks it could be adrenal insuff induced by Keytruda.


Will obtain cortisol levels and give a dose of hydrocortisone


Prognosis is poor with multiple mets, was diagnosed with left urothelial cancer 

in dec 2017, had left nephrectomy with ureterectomy


plus failure of prior chemotherapy. Has mets noted from april of this year.





Review of Systems





- Medications/Allergies


Allergies/Adverse Reactions: 


 Allergies











Allergy/AdvReac Type Severity Reaction Status Date / Time


 


No Known Allergies Allergy   Verified 12/11/17 10:56











Medications: 


 Current Medications





Acetaminophen (Tylenol)  650 mg VA Q6H PRN


   PRN Reason: Fever > 101 or Mild Pain 1-3


Allopurinol (Zyloprim)  200 mg PO HS BENJY


Apixaban (Eliquis)  10 mg PO BID [facility]EN[facility]


Atorvastatin Calcium (Lipitor)  20 mg PO HS [facility]


Bisacodyl (Dulcolax)  10 mg PO DAILYPRN PRN


   PRN Reason: Constipation


Carvedilol (Coreg)  3.125 mg PO BID [facility]


Dextrose/Water (Dextrose 50%)  25 gm SLOW IVP PRN PRN


   PRN Reason: Hypoglycemia


Glucagon (Glucagon)  1 mg IM PRN PRN


   PRN Reason: Hypoglycemia


Piperacillin Sod/Tazobactam (Sod 3.375 gm/ Sodium Chloride)  100 mls @ 200 mls/

hr IVPB Q6HR [facility]


   Last Admin: 11/17/18 05:43 Dose:  100 mls


Dextrose/Water (D5w)  1,000 mls @ 0 mls/hr IV .Q0M PRN


   PRN Reason: Hypoglycemia


Sodium Chloride (1/2 Normal Saline)  1,000 mls @ 75 mls/hr IV .[id number] [facility]


   Last Admin: 11/17/18 05:43 Dose:  1,000 mls


Insulin Human Lispro (Humalog)  0 units SC .MILD SLIDING SCALE PRN


   PRN Reason: Mild Correctional Scale


Ccu Electrolyte (Replacement Protocol)  0 each FS PRN PRN


   PRN Reason: FOR ELECTROLYTE REPLACEMENT


Sodium Chloride (Flush - Normal Saline)  10 ml IVF Q12HR [facility]


   Last Admin: 11/17/18 09:14 Dose:  10 ml


Tamsulosin HCl (Flomax)  0.4 mg PO QAM [facility]

## 2018-11-17 NOTE — PRG
DATE OF SERVICE:  11/17/2018

 

SUBJECTIVE:  This morning, he is awake, alert, and responsive.  He denies any pain.  Denies difficult
y breathing.

 

OBJECTIVE:

VITAL SIGNS:  His sats are _____ on room air, blood pressure 140/80, respiratory rate 18, temperature
 97.

CHEST:  Decreased breath sounds, no wheezing.

CARDIAC:  Normal S1 and S2, no gallops.

ABDOMEN:  Soft, no masses.

 

LABORATORY DATA:  Creatinine is 1.43.  His BNP is 122.  His CAT scan shows extensive bilateral mets w
ith loculated left-sided pleural effusion.  White count 19,000, H and H of 10 and 30, platelet count 
is 84.

 

IMPRESSION:

1.  Pulmonary emboli.

2.  Renal failure.

3.  Metastatic transitional cell carcinoma.

4.  Deep vein thrombosis.

5.  Transient ischemic attack.

 

PLAN:  He was transferred out of the ICU to a monitored bed.

 

We will follow.

## 2018-11-18 LAB
ANION GAP SERPL CALC-SCNC: 13 MMOL/L (ref 10–20)
BASOPHILS # BLD AUTO: 0 THOU/UL (ref 0–0.2)
BASOPHILS NFR BLD AUTO: 0 % (ref 0–1)
BUN SERPL-MCNC: 33 MG/DL (ref 8.4–25.7)
CALCIUM SERPL-MCNC: 10.9 MG/DL (ref 7.8–10.44)
CHLORIDE SERPL-SCNC: 109 MMOL/L (ref 98–107)
CO2 SERPL-SCNC: 22 MMOL/L (ref 23–31)
CREAT CL PREDICTED SERPL C-G-VRATE: 71 ML/MIN (ref 70–130)
EOSINOPHIL # BLD AUTO: 0 THOU/UL (ref 0–0.7)
EOSINOPHIL NFR BLD AUTO: 0.1 % (ref 0–10)
GLUCOSE SERPL-MCNC: 103 MG/DL (ref 80–115)
HGB BLD-MCNC: 9.9 G/DL (ref 14–18)
LYMPHOCYTES # BLD: 0.7 THOU/UL (ref 1.2–3.4)
LYMPHOCYTES NFR BLD AUTO: 3.8 % (ref 21–51)
MCH RBC QN AUTO: 26.9 PG (ref 27–31)
MCV RBC AUTO: 85.5 FL (ref 78–98)
MONOCYTES # BLD AUTO: 0.6 THOU/UL (ref 0.11–0.59)
MONOCYTES NFR BLD AUTO: 3.7 % (ref 0–10)
NEUTROPHILS # BLD AUTO: 15.8 THOU/UL (ref 1.4–6.5)
NEUTROPHILS NFR BLD AUTO: 92.4 % (ref 42–75)
PLATELET # BLD AUTO: 77 THOU/UL (ref 130–400)
POTASSIUM SERPL-SCNC: 3.6 MMOL/L (ref 3.5–5.1)
RBC # BLD AUTO: 3.67 MILL/UL (ref 4.7–6.1)
SODIUM SERPL-SCNC: 140 MMOL/L (ref 136–145)
WBC # BLD AUTO: 17.1 THOU/UL (ref 4.8–10.8)

## 2018-11-18 RX ADMIN — Medication SCH ML: at 09:44

## 2018-11-18 RX ADMIN — Medication SCH ML: at 20:25

## 2018-11-18 NOTE — MRI
MRI BRAIN NONCONTRAST:

 

DATE:  11/18/18 

 

HISTORY:

63-year-old male with high grade urothelial carcinoma presents with acute encephalopathy. Rule 
out brain metastasis. 

 

TECHNIQUE:  

The patient refused IV contrast injection. 

 

COMPARISON:

MRI brain with and without contrast dated 04/12/17. 

 

FINDINGS:

Images are degraded by patient motion, some severely so. There are several patchy new foci of restric
felix diffusion in the left cerebellar hemisphere. The largest one is wedge-shaped and measures approxi
mately 1.5 x 2.5 cm. They have hyperintense T2 signal. 

 

There is a moderate sized region of encephalomalacia and gliosis, without restricted diffusion, in th
e left medial occipital lobe and encroaching upon the adjacent portion of posteromedial left temporal
 lobe, representing an old infarction that is much more extensive that what was demonstrated on the 0
4/12/17 MRI. 

 

There is another new finding of several tiny foci of T2 hyperintensity in an anteroposterior array al
navdeep the bilateral frontal and parietal lobes, involving both cortex, subcortical white matter, and de
ep white matter, representing acute or subacute watershed infarctions between each sided MCA and CATALINO 
territories. 

 

There is no evidence of recent or remote intra-axial hemorrhage. Ventricles are normal in size and co
nfiguration. No mass effect, midline shift, or extra-axial fluid collection. 

 

IMPRESSION:

 

1.  Multiple small acute infarctions in the left PICA (posterior-inferior cerebellar artery) territor
y. 

 

2.  Multiple tiny watershed zone infarctions between the anterior cerebral artery territories and mid
dle cerebral artery territories, in the bilateral cerebral hemispheres. 

 

3.  Moderate sized old infarction in the left posterior cerebral artery territory. 

 

4.  Cannot rule out intracranial brain metastasis without IV contrast. The patient refused IV contras
t. 

 

 

REGINALDO NUNN  

 

POS: [facility]

## 2018-11-18 NOTE — PDOC.PN
- Subjective


Encounter Start Date: 11/18/18


Encounter Start Time: 09:15


Subjective: no sob or chest pain


-: PT couldn't ambulate due to low spo2


-: wife at bedside





- Objective


MAR Reviewed: Yes


Vital Signs & Weight: 


 Vital Signs (12 hours)











  Temp Pulse Resp BP Pulse Ox


 


 11/18/18 08:00  98.4 F  75  20  132/63  93 L


 


 11/18/18 04:44  97.4 F L  75  20  121/66  92 L


 


 11/18/18 00:30  97.8 F  87  18  117/57 L  92 L








 Weight











Admit Weight                   197 lb


 


Weight                         198 lb 11.2 oz











 Most Recent Monitor Data











Heart Rate from ECG            73


 


NIBP                           128/66


 


NIBP BP-Mean                   86


 


Respiration from ECG           25


 


SpO2                           94














I&O: 


 











 11/17/18 11/18/18 11/19/18





 06:59 06:59 06:59


 


Intake Total 3515 2345 


 


Output Total 901  


 


Balance 2614 2345 











Result Diagrams: 


 11/18/18 02:56





 11/18/18 02:56





Phys Exam





- Physical Examination


HEENT: PERRLA, moist MMs


Neck: no JVD, supple


Respiratory: no wheezing, no rales


Cardiovascular: RRR, no significant murmur


Gastrointestinal: soft, non-tender, positive bowel sounds


Musculoskeletal: pulses present, edema present


Neurological: non-focal, moves all 4 limbs


Psychiatric: normal affect, A&O x 3





Dx/Plan


(1) Pulmonary embolism


Code(s): I26.99 - OTHER PULMONARY EMBOLISM WITHOUT ACUTE COR PULMONALE   Status

: Acute   


Qualifiers: 


   Pulmonary embolism type: saddle   Chronicity: acute   Acute cor pulmonale 

presence: without acute cor pulmonale   Qualified Code(s): I26.92 - Saddle 

embolus of pulmonary artery without acute cor pulmonale   


Comment: on eliquis   





(2) Transitional cell carcinoma


Code(s): C68.9 - MALIGNANT NEOPLASM OF URINARY ORGAN, UNSPECIFIED   Status: 

Chronic   Comment: with multiple mets to liver and lungs   





(3) Multiple lesions of metastatic malignancy


Code(s): C79.9 - SECONDARY MALIGNANT NEOPLASM OF UNSPECIFIED SITE   Status: 

Chronic   





(4) Syncope


Code(s): R55 - SYNCOPE AND COLLAPSE   Status: Acute   Comment: sec to PE   





(5) severe hypoalbuminemia


Status: Chronic   





(6) Diabetes mellitus


Code(s): E11.9 - TYPE 2 DIABETES MELLITUS WITHOUT COMPLICATIONS   Status: 

Chronic   


Qualifiers: 


   Diabetes mellitus type: type 2   Diabetes mellitus long term insulin use: 

without long term use   Diabetes mellitus complication status: with unspecified 

complications   Qualified Code(s): E11.8 - Type 2 diabetes mellitus with 

unspecified complications   





(7) Hyperlipidemia


Code(s): E78.5 - HYPERLIPIDEMIA, UNSPECIFIED   Status: Chronic   


Qualifiers: 


   Hyperlipidemia type: unspecified   Qualified Code(s): E78.5 - Hyperlipidemia

, unspecified   





- Plan


is on eliquis


-: zosyn, nebs, nasal oxygen


-: to ambulate with PT on oxygen and rw


-: d/w wife at bedside, she will d/w palliative care in am and onc


-: might need placement wife will let us know by tomorrow





* .








Review of Systems





- Medications/Allergies


Allergies/Adverse Reactions: 


 Allergies











Allergy/AdvReac Type Severity Reaction Status Date / Time


 


No Known Allergies Allergy   Verified 12/11/17 10:56











Medications: 


 Current Medications





Acetaminophen (Tylenol)  650 mg [place] Q6H PRN


   PRN Reason: Fever > 101 or Mild Pain 1-3


Allopurinol (Zyloprim)  200 mg PO [facility]


   Last Admin: 11/17/18 21:16 Dose:  200 mg


Apixaban (Eliquis)  10 mg PO BID [facility]


   Last Admin: 11/18/18 09:44 Dose:  10 mg


Atorvastatin Calcium (Lipitor)  20 mg PO [facility]


   Last Admin: 11/17/18 21:16 Dose:  20 mg


Bisacodyl (Dulcolax)  10 mg PO DAILYPRN PRN


   PRN Reason: Constipation


Carvedilol (Coreg)  3.125 mg PO BID [facility]


   Last Admin: 11/18/18 09:44 Dose:  3.125 mg


Dextrose/Water (Dextrose 50%)  25 gm SLOW IVP PRN PRN


   PRN Reason: Hypoglycemia


Glucagon (Glucagon)  1 mg IM PRN PRN


   PRN Reason: Hypoglycemia


Piperacillin Sod/Tazobactam (Sod 3.375 gm/ Sodium Chloride)  100 mls @ 200 mls/

hr IVPB Q6HR [facility]


   Last Admin: 11/18/18 06:08 Dose:  100 mls


Dextrose/Water (D5w)  1,000 mls @ 0 mls/hr IV .Q0M PRN


   PRN Reason: Hypoglycemia


Sodium Chloride (1/2 Normal Saline)  1,000 mls @ 75 mls/hr IV .[id number] [facility]


   Last Admin: 11/17/18 22:00 Dose:  1,000 mls


Insulin Human Lispro (Humalog)  0 units SC .MILD SLIDING SCALE PRN


   PRN Reason: Mild Correctional Scale


Ccu Electrolyte (Replacement Protocol)  0 each FS PRN PRN


   PRN Reason: FOR ELECTROLYTE REPLACEMENT


Sodium Chloride (Flush - Normal Saline)  10 ml IVF Q12HR [facility]


   Last Admin: 11/18/18 09:44 Dose:  10 ml


Tamsulosin HCl (Flomax)  0.4 mg PO QAM [facility]


   Last Admin: 11/18/18 09:44 Dose:  0.4 mg

## 2018-11-18 NOTE — PRG
DATE OF SERVICE:  11/18/2018

 

SUBJECTIVE:  Jossue Melendez is a 63-year-old gentleman, who was confused last night.  An MRI of his hea
d was ordered.

 

OBJECTIVE:

VITAL SIGNS:  His sats were low when he sat up, but in bed is about 93%, blood pressure 120/59, respi
rations 18.

CHEST:  Decreased breath sounds, no wheezing.

CARDIAC:  Normal S1, S2.  No gallops.

ABDOMEN:  Soft, no masses.

 

LABORATORY DATA:  Creatinine 1.3.  White count _____, H and H 9 and 31.

 

IMPRESSION:

1.  Metastatic _____ carcinoma with loculated pleural effusion, multiple lesions.

2.  History of cerebrovascular accident.

3.  Pulmonary emboli, deep venous thrombosis.

 

I will switch him over to Eliquis.

 

Continue PT and supportive care.  Palliative care to see the patient.

## 2018-11-19 RX ADMIN — Medication SCH ML: at 08:19

## 2018-11-19 RX ADMIN — AMOXICILLIN AND CLAVULANATE POTASSIUM SCH MG: 875; 125 TABLET, FILM COATED ORAL at 08:19

## 2018-11-19 RX ADMIN — Medication SCH ML: at 20:33

## 2018-11-19 RX ADMIN — AMOXICILLIN AND CLAVULANATE POTASSIUM SCH MG: 875; 125 TABLET, FILM COATED ORAL at 20:31

## 2018-11-19 NOTE — PDOC.PN
- Subjective


Encounter Start Date: 11/19/18


Encounter Start Time: 10:45


Subjective: no sob, is eating better


-: wife at bedside





- Objective


MAR Reviewed: Yes


Vital Signs & Weight: 


 Vital Signs (12 hours)











  Temp Pulse Resp BP Pulse Ox


 


 11/19/18 08:00      92 L


 


 11/19/18 07:00  97.6 F  68  18  125/62  92 L


 


 11/19/18 03:38  97.4 F L  82  18  130/61  92 L


 


 11/18/18 23:49  98.0 F  88  18  125/61  91 L








 Weight











Admit Weight                   197 lb


 


Weight                         197 lb 8 oz











 Most Recent Monitor Data











Heart Rate from ECG            73


 


NIBP                           128/66


 


NIBP BP-Mean                   86


 


Respiration from ECG           25


 


SpO2                           94














I&O: 


 











 11/18/18 11/19/18 11/20/18





 06:59 06:59 06:59


 


Intake Total 2345 1325 


 


Output Total  975 


 


Balance 2345 350 











Result Diagrams: 


 11/18/18 02:56





 11/18/18 02:56





Phys Exam





- Physical Examination


HEENT: PERRLA, moist MMs


Neck: no JVD, supple


Respiratory: no wheezing, no rales


Cardiovascular: RRR, no significant murmur


Gastrointestinal: soft, non-tender, positive bowel sounds


Musculoskeletal: no edema, pulses present


Neurological: non-focal, moves all 4 limbs





Dx/Plan


(1) Pulmonary embolism


Code(s): I26.99 - OTHER PULMONARY EMBOLISM WITHOUT ACUTE COR PULMONALE   Status

: Acute   


Qualifiers: 


   Pulmonary embolism type: saddle   Chronicity: acute   Acute cor pulmonale 

presence: without acute cor pulmonale   Qualified Code(s): I26.92 - Saddle 

embolus of pulmonary artery without acute cor pulmonale   


Comment: on eliquis   





(2) Transitional cell carcinoma


Code(s): C68.9 - MALIGNANT NEOPLASM OF URINARY ORGAN, UNSPECIFIED   Status: 

Chronic   Comment: with multiple mets to liver and lungs   





(3) Multiple lesions of metastatic malignancy


Code(s): C79.9 - SECONDARY MALIGNANT NEOPLASM OF UNSPECIFIED SITE   Status: 

Chronic   





(4) Syncope


Code(s): R55 - SYNCOPE AND COLLAPSE   Status: Acute   Comment: sec to PE   





(5) severe hypoalbuminemia


Status: Chronic   





(6) Diabetes mellitus


Code(s): E11.9 - TYPE 2 DIABETES MELLITUS WITHOUT COMPLICATIONS   Status: 

Chronic   


Qualifiers: 


   Diabetes mellitus type: type 2   Diabetes mellitus long term insulin use: 

without long term use   Diabetes mellitus complication status: with unspecified 

complications   Qualified Code(s): E11.8 - Type 2 diabetes mellitus with 

unspecified complications   





(7) Hyperlipidemia


Code(s): E78.5 - HYPERLIPIDEMIA, UNSPECIFIED   Status: Chronic   


Qualifiers: 


   Hyperlipidemia type: unspecified   Qualified Code(s): E78.5 - Hyperlipidemia

, unspecified   





(8) Acute CVA (cerebrovascular accident)


Code(s): I63.9 - CEREBRAL INFARCTION, UNSPECIFIED   Status: Acute   Comment: 

multiple small left pica and watershed infarcts at ryley/mca areas


Old large left pca infarct.   





- Plan


is on eliquis, oxygen by nasal canula


-: d/w wife at bedside, wants to take him home eventually


-: family is deciding on code status/hospice/palliative care options


-: will set up HH with PT, wife wants another day before she can arrange home 


-: -for him to come. Prognosis poor. Is on keytruda for urothelial ca





* .


continue asp, lipitor. switch zosyn to augmentin


Cognitive deficits from cva, no motor involvement on clinical exam. Has no 

trouble swallowing and has been eating all his hospitalization.





Review of Systems





- Medications/Allergies


Allergies/Adverse Reactions: 


 Allergies











Allergy/AdvReac Type Severity Reaction Status Date / Time


 


No Known Allergies Allergy   Verified 12/11/17 10:56











Medications: 


 Current Medications





Acetaminophen (Tylenol)  650 mg [place] Q6H PRN


   PRN Reason: Fever > 101 or Mild Pain 1-3


Allopurinol (Zyloprim)  200 mg PO [facility]


   Last Admin: 11/18/18 20:19 Dose:  200 mg


Amoxicillin/Clavulanate Potassium (Augmentin)  875 mg [facility]HR [facility]


   Last Admin: 11/19/18 08:19 Dose:  875 mg


Apixaban (Eliquis)  10 mg [facility] BID [facility]


   Last Admin: 11/19/18 08:18 Dose:  10 mg


Aspirin (Aspirin Chewable)  81 mg PO DAILY [facility]


   Last Admin: 11/19/18 08:19 Dose:  81 mg


Atorvastatin Calcium (Lipitor)  20 mg PO [facility]


   Last Admin: 11/18/18 20:20 Dose:  20 mg


Bisacodyl (Dulcolax)  10 mg PO DAILYPRN PRN


   PRN Reason: Constipation


Carvedilol (Coreg)  3.125 mg PO BID [facility]


   Last Admin: 11/19/18 08:19 Dose:  3.125 mg


Dextrose/Water (Dextrose 50%)  25 gm SLOW IVP PRN PRN


   PRN Reason: Hypoglycemia


Glucagon (Glucagon)  1 mg IM PRN PRN


   PRN Reason: Hypoglycemia


Dextrose/Water (D5w)  1,000 mls @ 0 mls/hr IV .Q0M PRN


   PRN Reason: Hypoglycemia


Insulin Human Lispro (Humalog)  0 units SC .MILD SLIDING SCALE PRN


   PRN Reason: Mild Correctional Scale


Ccu Electrolyte (Replacement Protocol)  0 each FS PRN PRN


   PRN Reason: FOR ELECTROLYTE REPLACEMENT


Sodium Chloride (Flush - Normal Saline)  10 ml IVF Q12HR [facility]


   Last Admin: 11/19/18 08:19 Dose:  10 ml


Tamsulosin HCl (Flomax)  0.4 mg PO QAM [facility]


   Last Admin: 11/19/18 08:19 Dose:  0.4 mg

## 2018-11-20 VITALS — DIASTOLIC BLOOD PRESSURE: 63 MMHG | TEMPERATURE: 97.5 F | SYSTOLIC BLOOD PRESSURE: 135 MMHG

## 2018-11-20 RX ADMIN — AMOXICILLIN AND CLAVULANATE POTASSIUM SCH MG: 875; 125 TABLET, FILM COATED ORAL at 09:44

## 2018-11-20 NOTE — PDOC.PN
- Subjective


Encounter Start Date: 11/20/18


Encounter Start Time: 10:00


Subjective: no new complaints, wife at bedside





- Objective


Resuscitation Status: 


 











Resuscitation Status           DNR:Do Not Resuscitate














MAR Reviewed: Yes


Vital Signs & Weight: 


 Vital Signs (12 hours)











  Temp Pulse Resp BP Pulse Ox


 


 11/20/18 08:00  97.5 F L  68  16  135/63  92 L


 


 11/20/18 07:42  97.5 F L  68  16  135/63  92 L








 Weight











Admit Weight                   197 lb


 


Weight                         197 lb 8 oz











 Most Recent Monitor Data











Heart Rate from ECG            73


 


NIBP                           128/66


 


NIBP BP-Mean                   86


 


Respiration from ECG           25


 


SpO2                           94














I&O: 


 











 11/19/18 11/20/18 11/21/18





 06:59 06:59 06:59


 


Intake Total 1325 850 


 


Output Total 975  


 


Balance 350 850 











Result Diagrams: 


 11/18/18 02:56





 11/18/18 02:56





Phys Exam





- Physical Examination


HEENT: PERRLA, moist MMs


Neck: no JVD, supple


Respiratory: no wheezing, no rales


Cardiovascular: RRR, no significant murmur


Gastrointestinal: soft, non-tender, positive bowel sounds


Musculoskeletal: no edema, pulses present


Neurological: non-focal, moves all 4 limbs





Dx/Plan


(1) Pulmonary embolism


Code(s): I26.99 - OTHER PULMONARY EMBOLISM WITHOUT ACUTE COR PULMONALE   Status

: Acute   


Qualifiers: 


   Pulmonary embolism type: saddle   Chronicity: acute   Acute cor pulmonale 

presence: without acute cor pulmonale   Qualified Code(s): I26.92 - Saddle 

embolus of pulmonary artery without acute cor pulmonale   


Comment: on eliquis   





(2) Transitional cell carcinoma


Code(s): C68.9 - MALIGNANT NEOPLASM OF URINARY ORGAN, UNSPECIFIED   Status: 

Chronic   Comment: with multiple mets to liver and lungs   





(3) Multiple lesions of metastatic malignancy


Code(s): C79.9 - SECONDARY MALIGNANT NEOPLASM OF UNSPECIFIED SITE   Status: 

Chronic   





(4) Syncope


Code(s): R55 - SYNCOPE AND COLLAPSE   Status: Acute   Comment: sec to PE   





(5) severe hypoalbuminemia


Status: Chronic   





(6) Diabetes mellitus


Code(s): E11.9 - TYPE 2 DIABETES MELLITUS WITHOUT COMPLICATIONS   Status: 

Chronic   


Qualifiers: 


   Diabetes mellitus type: type 2   Diabetes mellitus long term insulin use: 

without long term use   Diabetes mellitus complication status: with unspecified 

complications   Qualified Code(s): E11.8 - Type 2 diabetes mellitus with 

unspecified complications   





(7) Hyperlipidemia


Code(s): E78.5 - HYPERLIPIDEMIA, UNSPECIFIED   Status: Chronic   


Qualifiers: 


   Hyperlipidemia type: unspecified   Qualified Code(s): E78.5 - Hyperlipidemia

, unspecified   





(8) Acute CVA (cerebrovascular accident)


Code(s): I63.9 - CEREBRAL INFARCTION, UNSPECIFIED   Status: Acute   Comment: 

multiple small left pica and watershed infarcts at ryley/mca areas


Old large left pca infarct.   





- Plan


dc pt home with hospice per families wishes


-: prognosis poor


-: will need home oxygen





* .

## 2018-11-20 NOTE — DIS
DATE OF ADMISSION:  11/15/2018

 

DATE OF DISCHARGE:  11/20/2018

 

DISCHARGE DISPOSITION:  To home with hospice.

 

PRIMARY DISCHARGE DIAGNOSES:  Bilateral pulmonary embolus, transitional cell carcinoma with multiple 
mets to liver and lungs, syncope due to pulmonary embolism, acute cerebrovascular accident with histo
ry of prior large left posterior cerebral artery infarct, severe hypoalbuminemia, failure to thrive, 
moderate to severe protein malnutrition due to inadequate oral intake and likely diabetes mellitus ty
pe 2, dyslipidemia.

 

PROCEDURES DONE DURING HOSPITALIZATION:  Patient had CT angio chest done on the day of admission, [facility] showed bilateral pulmonary emboli, progression of pulmonary metastatic disease with numerous enlar
ging pulmonary nodules.  There is also associated mediastinal and hilar adenopathy, bilateral pleural
 effusions, diffuse metastatic disease to the liver.  Echo with 2D Doppler showed EF of 60% to 65% wi
th grade 1/3 diastolic dysfunction, large pleural effusion was seen.  MRI brain without IV contrast a
s patient did not want contrast showed multiple small acute infarcts in the left PCA territory, multi
ple tiny watershed zone infarcts between the CATALINO and MCA territories in bilateral cerebral hemisphere
s, moderate sized old infarct in the left PCA territory.  H&H 10 and 31, platelet count 77, MCV is 85
.  Discharge BUN and creatinine is 33 and 1.3, albumin was 1.9.  Troponin was indeterminate peaking u
p to 0.1.  CK-MB 1.0.

 

DISCHARGE MEDICATIONS:  Eliquis 5 mg twice daily, allopurinol 200 mg p.o. at bedtime, Coreg 3.125 mg 
p.o. twice daily, Augmentin 875 mg p.o. twice daily for 5 days, Lipitor 20 mg p.o. at bedtime, Flomax
 0.4 mg p.o. q.a.m.

 

ALLERGIES:  No known drug allergies.

 

DISCHARGE PLAN:  The patient is being discharged to home with hospice.

 

BRIEF COURSE DURING HOSPITALIZATION:  Patient initially was brought to emergency room on the 15th aft
er he had passed out.  Initial CT angio chest done showed bilateral massive PE.  He was also hypotens
jeanne and was placed in the ICU.  He was on heparin drip initially.  Patient was on Xarelto prior to th
is.  He has had consultation with Dr. Sanchez for Pulmonary Critical Care.  Patient's CAT scan also r
evealed multiple mets in the lungs and liver as well.  He also had very low albumin levels and likely
 had progressive transitional cell carcinoma.  The patient was transitioned to oral Eliquis.  He has 
encephalopathy likely due to progression of his cancer, poor oral intake and current multiple debilit
ating disease.  Palliative care consultation was requested due to progressive cancer and multiple met
s.  The patient and family have opted for home with hospice.  His overall prognosis is very poor.  Th
e patient was seen and examined on the day of discharge.

## 2018-11-20 NOTE — PRG
DATE OF SERVICE:  11/19/2018

 

SERVICE:  Pulmonary Medicine.

 

INTERVAL HISTORY:  The patient denies having significant shortness of breath or 
chest discomfort.  Otherwise, there has been no interval change to his 
condition.  The patient's family is transitioning home with hospice first thing 
in the morning.

 

PHYSICAL EXAMINATION:

VITAL SIGNS:  Afebrile, pulse 68, blood pressure 125/62, respirations 18, 
saturation 92% on 4 liters nasal cannula.

GENERAL:  The patient is awake, alert, in no apparent distress.

LUNGS:  Decent air entry.  Dependent crackles are minimal.  There is no 
prolonged expiratory phase or wheezing present.

HEART:  Normal rate, regular.

ABDOMEN:  Soft, nontender, and nondistended.  Bowel sounds are positive.

MUSCULOSKELETAL:  No cyanosis or clubbing.  There is no pitting in the 
bilateral lower extremities.

NEUROLOGIC:  Grossly nonfocal.

 

IMAGING DATA:  MRI of the brain demonstrates multiple small subacute 
infarctions in the left PICA territory.  Multiple watershed infarctions were 
also noted.  Old infarction is noted in the posterior artery territory.  The 
patient refused IV contrast, so an intracranial metastasis cannot be excluded.

 

ASSESSMENT:

1.  Acute pulmonary embolism.

2.  Syncope.

3.  Transition cell carcinoma, widely metastatic.

4.  Acute hypoxic respiratory failure.

5.  Chronic kidney disease.

6.  Non-ST elevation myocardial infarction,

 

DISCUSSION AND PLAN:  The patient was transitioning home with hospice.  At this 
point, he has no further requirements for inpatient Pulmonary or critical care 
opinion.  He will need to go out on anticoagulation, and oxygen for comfort.  
Please call with additional questions or concerns moving forward.

 

TRUPTI